# Patient Record
Sex: FEMALE | Race: BLACK OR AFRICAN AMERICAN | Employment: UNEMPLOYED | URBAN - METROPOLITAN AREA
[De-identification: names, ages, dates, MRNs, and addresses within clinical notes are randomized per-mention and may not be internally consistent; named-entity substitution may affect disease eponyms.]

---

## 2019-03-12 ENCOUNTER — OFFICE VISIT (OUTPATIENT)
Dept: FAMILY MEDICINE | Facility: CLINIC | Age: 59
End: 2019-03-12
Payer: COMMERCIAL

## 2019-03-12 VITALS
OXYGEN SATURATION: 99 % | BODY MASS INDEX: 46.26 KG/M2 | TEMPERATURE: 98.1 F | HEIGHT: 64 IN | WEIGHT: 271 LBS | HEART RATE: 104 BPM | RESPIRATION RATE: 20 BRPM | DIASTOLIC BLOOD PRESSURE: 100 MMHG | SYSTOLIC BLOOD PRESSURE: 140 MMHG

## 2019-03-12 DIAGNOSIS — Z13.220 LIPID SCREENING: ICD-10-CM

## 2019-03-12 DIAGNOSIS — E66.01 MORBID OBESITY (H): ICD-10-CM

## 2019-03-12 DIAGNOSIS — R73.01 IMPAIRED FASTING GLUCOSE: ICD-10-CM

## 2019-03-12 DIAGNOSIS — R74.01 ELEVATED AST (SGOT): ICD-10-CM

## 2019-03-12 DIAGNOSIS — I10 BENIGN ESSENTIAL HYPERTENSION: ICD-10-CM

## 2019-03-12 DIAGNOSIS — Z00.00 ROUTINE GENERAL MEDICAL EXAMINATION AT A HEALTH CARE FACILITY: Primary | ICD-10-CM

## 2019-03-12 PROCEDURE — 99202 OFFICE O/P NEW SF 15 MIN: CPT | Performed by: FAMILY MEDICINE

## 2019-03-12 RX ORDER — ASPIRIN 81 MG/1
81 TABLET ORAL
COMMUNITY
Start: 2015-04-09 | End: 2020-02-25

## 2019-03-12 RX ORDER — TRIAMCINOLONE ACETONIDE 1 MG/G
OINTMENT TOPICAL
COMMUNITY
Start: 2017-03-30 | End: 2020-02-25

## 2019-03-12 SDOH — HEALTH STABILITY: MENTAL HEALTH: HOW OFTEN DO YOU HAVE A DRINK CONTAINING ALCOHOL?: NEVER

## 2019-03-12 ASSESSMENT — MIFFLIN-ST. JEOR: SCORE: 1804.25

## 2019-03-12 NOTE — PATIENT INSTRUCTIONS
Preventive Health Recommendations  Female Ages 50 - 64    Yearly exam: See your health care provider every year in order to  o Review health changes.   o Discuss preventive care.    o Review your medicines if your doctor has prescribed any.      Get a Pap test every three years (unless you have an abnormal result and your provider advises testing more often).    If you get Pap tests with HPV test, you only need to test every 5 years, unless you have an abnormal result.     You do not need a Pap test if your uterus was removed (hysterectomy) and you have not had cancer.    You should be tested each year for STDs (sexually transmitted diseases) if you're at risk.     Have a mammogram every 1 to 2 years.    Have a colonoscopy at age 50, or have a yearly FIT test (stool test). These exams screen for colon cancer.      Have a cholesterol test every 5 years, or more often if advised.    Have a diabetes test (fasting glucose) every three years. If you are at risk for diabetes, you should have this test more often.     If you are at risk for osteoporosis (brittle bone disease), think about having a bone density scan (DEXA).    Shots: Get a flu shot each year. Get a tetanus shot every 10 years.    Nutrition:     Eat at least 5 servings of fruits and vegetables each day.    Eat whole-grain bread, whole-wheat pasta and brown rice instead of white grains and rice.    Get adequate Calcium and Vitamin D.     Lifestyle    Exercise at least 150 minutes a week (30 minutes a day, 5 days a week). This will help you control your weight and prevent disease.    Limit alcohol to one drink per day.    No smoking.     Wear sunscreen to prevent skin cancer.     See your dentist every six months for an exam and cleaning.    See your eye doctor every 1 to 2 years.      1. Your blood pressure has been high or you are starting a new medication for it :   Please take 2-4-6 blood pressures and heart rates ocer a few days  and write them down  with date, time of day and location and bring this record in in 3-5 weeks. The optimal blood  pressure is < 140/80 or close to this most of the time.    BP was 160/100    If still high, return     2.  Weight Loss Tips  1. Do not eat after 6 hrs before your expected bedtime  2. Have your heaviest meal for breakfast, a slightly lighter meal at lunch and a snack 6 hrs before bed  3. No sugar/calorie drinks except milk ie no fruit juice, pop, alcohol.  4. Drink milk 30min before meals to decrease your hunger. Also it is excellent as part of your last meal of the day snack  5. Drink lots of water  6. Increase fiber in diet: all bran cereal, salads, popcorn etc  7. Have only one small serving of fruit a day about 1/2 cup (as this is high in sugar)  8. EXERCISE is the bottom line. Without it, you will gain weight even on a low calorie diet. Best if done 2-3X a day as can    Being overweight contributes to high blood pressure and high cholesterol, both of which cause heart attacks, strokes and kidney failure, prediabetes and diabetes, arthritis, and liver disease       3. . Schedule your mammo at [facility]  At [address] at [phone]      4. Please do your breast exam every mo, when you  Change the  calendar page or set an alarm on your cell phone Do a  visual check for dimples, inversion or indentation or any different position of the nipple Feel manually  for any 1cm or larger  size mass ie about the size of an almond Be sure to cover the entire area of both breasts : this extends back to the back on either side and from the collar bone to the bottom of the breasts where you can begin to feel ribs.  Men are advised to do this exam also as they now have a higher rate of breast cancer , like women do .

## 2019-03-12 NOTE — NURSING NOTE
"Chief Complaint   Patient presents with     Physical     BP (!) 160/100   Pulse 104   Temp 98.1  F (36.7  C) (Tympanic)   Resp 20   Ht 1.626 m (5' 4\")   Wt 122.9 kg (271 lb)   LMP  (LMP Unknown)   SpO2 99%   Breastfeeding? No   BMI 46.52 kg/m   Estimated body mass index is 46.52 kg/m  as calculated from the following:    Height as of this encounter: 1.626 m (5' 4\").    Weight as of this encounter: 122.9 kg (271 lb).  BP completed using cuff size: georgi Silva CMA    Health Maintenance Due   Topic Date Due     PHQ-2 Q1 YR  12/19/1974     HIV SCREEN (SYSTEM ASSIGNED)  12/19/1980     HEPATITIS C SCREENING  12/19/1980     PAP SCREENING Q3 YR (SYSTEM ASSIGNED)  12/19/1983     LIPID SCREEN Q5 YR FEMALE (SYSTEM ASSIGNED)  12/19/2007     MAMMO SCREEN Q2 YR (SYSTEM ASSIGNED)  02/19/2009     COLON CANCER SCREEN (SYSTEM ASSIGNED)  12/19/2012     ZOSTER IMMUNIZATION (1 of 2) 12/19/2012     ADVANCE DIRECTIVE PLANNING Q5 YRS  12/19/2017     INFLUENZA VACCINE (1) 09/01/2018     Health Maintenance reviewed at today's visit patient asked to schedule/complete:   Routine Health Visit: Patient agrees to schedule  Breast Cancer:  Patient agrees to schedule  Cervical Cancer:  Patient agrees to schedule  Immunizations:  Patient agrees to schedule    "

## 2019-05-10 ENCOUNTER — OFFICE VISIT (OUTPATIENT)
Dept: FAMILY MEDICINE | Facility: CLINIC | Age: 59
End: 2019-05-10
Payer: COMMERCIAL

## 2019-05-10 VITALS
TEMPERATURE: 97.2 F | OXYGEN SATURATION: 99 % | BODY MASS INDEX: 46.86 KG/M2 | HEART RATE: 64 BPM | RESPIRATION RATE: 16 BRPM | SYSTOLIC BLOOD PRESSURE: 110 MMHG | WEIGHT: 273 LBS | DIASTOLIC BLOOD PRESSURE: 70 MMHG

## 2019-05-10 DIAGNOSIS — Z12.11 SCREEN FOR COLON CANCER: ICD-10-CM

## 2019-05-10 DIAGNOSIS — Z83.3 FAMILY HISTORY OF DIABETES MELLITUS: ICD-10-CM

## 2019-05-10 DIAGNOSIS — Z11.59 NEED FOR HEPATITIS C SCREENING TEST: ICD-10-CM

## 2019-05-10 DIAGNOSIS — Z86.2 HISTORY OF IRON DEFICIENCY ANEMIA: ICD-10-CM

## 2019-05-10 DIAGNOSIS — R03.0 ELEVATED BLOOD PRESSURE READING WITHOUT DIAGNOSIS OF HYPERTENSION: Primary | ICD-10-CM

## 2019-05-10 DIAGNOSIS — Z13.6 CARDIOVASCULAR SCREENING; LDL GOAL LESS THAN 160: ICD-10-CM

## 2019-05-10 DIAGNOSIS — R74.01 ELEVATED AST (SGOT): ICD-10-CM

## 2019-05-10 DIAGNOSIS — Z12.4 SCREENING FOR MALIGNANT NEOPLASM OF CERVIX: ICD-10-CM

## 2019-05-10 DIAGNOSIS — Z11.4 SCREENING FOR HIV (HUMAN IMMUNODEFICIENCY VIRUS): ICD-10-CM

## 2019-05-10 LAB
ALBUMIN SERPL-MCNC: 3.9 G/DL (ref 3.4–5)
ALP SERPL-CCNC: 72 U/L (ref 40–150)
ALT SERPL W P-5'-P-CCNC: 21 U/L (ref 0–50)
ANION GAP SERPL CALCULATED.3IONS-SCNC: 5 MMOL/L (ref 3–14)
AST SERPL W P-5'-P-CCNC: 34 U/L (ref 0–45)
BASOPHILS # BLD AUTO: 0 10E9/L (ref 0–0.2)
BASOPHILS NFR BLD AUTO: 0.3 %
BILIRUB SERPL-MCNC: 0.4 MG/DL (ref 0.2–1.3)
BUN SERPL-MCNC: 9 MG/DL (ref 7–30)
CALCIUM SERPL-MCNC: 9.6 MG/DL (ref 8.5–10.1)
CHLORIDE SERPL-SCNC: 107 MMOL/L (ref 94–109)
CHOLEST SERPL-MCNC: 190 MG/DL
CO2 SERPL-SCNC: 26 MMOL/L (ref 20–32)
CREAT SERPL-MCNC: 0.74 MG/DL (ref 0.52–1.04)
DIFFERENTIAL METHOD BLD: ABNORMAL
EOSINOPHIL # BLD AUTO: 0.2 10E9/L (ref 0–0.7)
EOSINOPHIL NFR BLD AUTO: 2.5 %
ERYTHROCYTE [DISTWIDTH] IN BLOOD BY AUTOMATED COUNT: 15.2 % (ref 10–15)
GFR SERPL CREATININE-BSD FRML MDRD: >90 ML/MIN/{1.73_M2}
GLUCOSE SERPL-MCNC: 90 MG/DL (ref 70–99)
HBA1C MFR BLD: 5.4 % (ref 0–5.6)
HCT VFR BLD AUTO: 39.7 % (ref 35–47)
HDLC SERPL-MCNC: 82 MG/DL
HGB BLD-MCNC: 12.6 G/DL (ref 11.7–15.7)
LDLC SERPL CALC-MCNC: 95 MG/DL
LYMPHOCYTES # BLD AUTO: 3.4 10E9/L (ref 0.8–5.3)
LYMPHOCYTES NFR BLD AUTO: 49.9 %
MCH RBC QN AUTO: 27.2 PG (ref 26.5–33)
MCHC RBC AUTO-ENTMCNC: 31.7 G/DL (ref 31.5–36.5)
MCV RBC AUTO: 86 FL (ref 78–100)
MONOCYTES # BLD AUTO: 0.6 10E9/L (ref 0–1.3)
MONOCYTES NFR BLD AUTO: 9 %
NEUTROPHILS # BLD AUTO: 2.6 10E9/L (ref 1.6–8.3)
NEUTROPHILS NFR BLD AUTO: 38.3 %
NONHDLC SERPL-MCNC: 108 MG/DL
PLATELET # BLD AUTO: 217 10E9/L (ref 150–450)
POTASSIUM SERPL-SCNC: 3.7 MMOL/L (ref 3.4–5.3)
PROT SERPL-MCNC: 8.9 G/DL (ref 6.8–8.8)
RBC # BLD AUTO: 4.63 10E12/L (ref 3.8–5.2)
SODIUM SERPL-SCNC: 138 MMOL/L (ref 133–144)
TRIGL SERPL-MCNC: 66 MG/DL
WBC # BLD AUTO: 6.8 10E9/L (ref 4–11)

## 2019-05-10 PROCEDURE — 87624 HPV HI-RISK TYP POOLED RSLT: CPT | Performed by: FAMILY MEDICINE

## 2019-05-10 PROCEDURE — 80061 LIPID PANEL: CPT | Performed by: FAMILY MEDICINE

## 2019-05-10 PROCEDURE — 86803 HEPATITIS C AB TEST: CPT | Performed by: FAMILY MEDICINE

## 2019-05-10 PROCEDURE — 99214 OFFICE O/P EST MOD 30 MIN: CPT | Performed by: FAMILY MEDICINE

## 2019-05-10 PROCEDURE — 36415 COLL VENOUS BLD VENIPUNCTURE: CPT | Performed by: FAMILY MEDICINE

## 2019-05-10 PROCEDURE — 87389 HIV-1 AG W/HIV-1&-2 AB AG IA: CPT | Performed by: FAMILY MEDICINE

## 2019-05-10 PROCEDURE — 80053 COMPREHEN METABOLIC PANEL: CPT | Performed by: FAMILY MEDICINE

## 2019-05-10 PROCEDURE — 85025 COMPLETE CBC W/AUTO DIFF WBC: CPT | Performed by: FAMILY MEDICINE

## 2019-05-10 PROCEDURE — G0145 SCR C/V CYTO,THINLAYER,RESCR: HCPCS | Performed by: FAMILY MEDICINE

## 2019-05-10 PROCEDURE — 83036 HEMOGLOBIN GLYCOSYLATED A1C: CPT | Performed by: FAMILY MEDICINE

## 2019-05-10 NOTE — PATIENT INSTRUCTIONS
I strongly recommend getting the shingles vaccine (Shingrix) if you are over age 50, but please check with your insurance to see how much you might have to pay for this vaccine! If you are on Medicare, it's covered if you get it at a pharmacy but not in a clinic.    This shot will prevent shingles, a painful skin rash that you are at risk for getting if you have ever had chicken pox. Sometimes the pain will last the rest of your life, even after the rash has healed, and there is not much that we can do to relieve the pain.    Please consider getting this vaccine!

## 2019-05-10 NOTE — PROGRESS NOTES
SUBJECTIVE:   Maida Jarrett is a 58 year old female who presents to clinic today for the following   health issues:      Hypertension Follow-up      Outpatient blood pressures are not being checked.    Low Salt Diet: no added salt  BP Readings from Last 3 Encounters:   05/10/19 110/70   03/12/19 (!) 140/100            Amount of exercise or physical activity: 4-5 days/week for an average of 30-45 minutes    Problems taking medications regularly: No    Medication side effects: none    Diet: low salt, does not eat meat      Family HIstory of Diabetes       Patient is checking blood sugars: not at all    Diabetic concerns: None     Symptoms of hypoglycemia (low blood sugar): none     Paresthesias (numbness or burning in feet) or sores: No       BP Readings from Last 2 Encounters:   05/10/19 110/70   03/12/19 (!) 140/100     No results found for: A1C, LDL    Diabetes Management Resources    History of iron deficiency anemia  Diagnosed many years ago, was related to heavy periods with fatigue and lack of energy. She is now menopausal. She has taken iron supplements off and on over the past few years. She denies chest pain, sob, fatigue.    Patient Active Problem List   Diagnosis     Morbid obesity with BMI of 40.0-44.9, adult (H)     Elevated blood pressure reading without diagnosis of hypertension     Morbid obesity (H)  BMI 40-50     Elevated AST (SGOT)     Past Surgical History:   Procedure Laterality Date     TUBAL LIGATION  1987       Social History     Tobacco Use     Smoking status: Never Smoker     Smokeless tobacco: Never Used   Substance Use Topics     Alcohol use: No     Frequency: Never     Family History   Problem Relation Age of Onset     Cancer Mother 80        liver/bile duct cancer     Hypertension Mother      Diabetes Type 2  Mother      Liver Cancer Father 40         No Known Allergies  No lab results found.   BP Readings from Last 3 Encounters:   05/10/19 110/70   03/12/19 (!) 140/100    Wt  Readings from Last 3 Encounters:   05/10/19 123.8 kg (273 lb)   03/12/19 122.9 kg (271 lb)                    ROS:  Constitutional, HEENT, cardiovascular, pulmonary, GI, , musculoskeletal, neuro, skin, endocrine and psych systems are negative, except as otherwise noted.    OBJECTIVE:     /70 (BP Location: Left arm, Patient Position: Sitting, Cuff Size: Adult Large)   Pulse 64   Temp 97.2  F (36.2  C) (Tympanic)   Resp 16   Wt 123.8 kg (273 lb)   LMP  (LMP Unknown)   SpO2 99%   Breastfeeding? No   BMI 46.86 kg/m    Body mass index is 46.86 kg/m .  GENERAL: healthy, alert and no distress  EYES: Eyes grossly normal to inspection, PERRL and conjunctivae and sclerae normal  HENT: ear canals and TM's normal, nose and mouth without ulcers or lesions  NECK: no adenopathy, no asymmetry, masses, or scars and thyroid normal to palpation  RESP: lungs clear to auscultation - no rales, rhonchi or wheezes  CV: regular rate and rhythm, normal S1 S2, no S3 or S4, no murmur, click or rub, no peripheral edema and peripheral pulses strong  ABDOMEN: soft, nontender, no hepatosplenomegaly, no masses and bowel sounds normal   (female): normal female external genitalia, normal urethral meatus, vaginal mucosa pink, moist, well rugated, and normal cervix/adnexa/uterus without masses or discharge  MS: no gross musculoskeletal defects noted, no edema  SKIN: no suspicious lesions or rashes  NEURO: Normal strength and tone, mentation intact and speech normal  PSYCH: mentation appears normal, affect normal/bright    Results for orders placed or performed in visit on 05/10/19   Hemoglobin A1c   Result Value Ref Range    Hemoglobin A1C 5.4 0 - 5.6 %   CBC with platelets and differential   Result Value Ref Range    WBC 6.8 4.0 - 11.0 10e9/L    RBC Count 4.63 3.8 - 5.2 10e12/L    Hemoglobin 12.6 11.7 - 15.7 g/dL    Hematocrit 39.7 35.0 - 47.0 %    MCV 86 78 - 100 fl    MCH 27.2 26.5 - 33.0 pg    MCHC 31.7 31.5 - 36.5 g/dL    RDW  15.2 (H) 10.0 - 15.0 %    Platelet Count 217 150 - 450 10e9/L    % Neutrophils 38.3 %    % Lymphocytes 49.9 %    % Monocytes 9.0 %    % Eosinophils 2.5 %    % Basophils 0.3 %    Absolute Neutrophil 2.6 1.6 - 8.3 10e9/L    Absolute Lymphocytes 3.4 0.8 - 5.3 10e9/L    Absolute Monocytes 0.6 0.0 - 1.3 10e9/L    Absolute Eosinophils 0.2 0.0 - 0.7 10e9/L    Absolute Basophils 0.0 0.0 - 0.2 10e9/L    Diff Method Automated Method         ASSESSMENT/PLAN:     1. Elevated blood pressure reading without diagnosis of hypertension  Now normal, but will check kidney function today and monitor blood pressure regularly  - Comprehensive metabolic panel    2. History of iron deficiency anemia  Hemoglobin   Date Value Ref Range Status   05/10/2019 12.6 11.7 - 15.7 g/dL Final   07/30/2009 12.2 11.7 - 15.7 g/dL Final   03/25/2008 10.2 (L) 11.7 - 15.7 g/dL Final   normal today  - CBC with platelets and differential    3. Family history of diabetes mellitus  Normal today  - Hemoglobin A1c    4. Elevated AST (SGOT)  recheck  - Comprehensive metabolic panel    5. Screening for malignant neoplasm of cervix  Pap done today  Will fu as indicated.   - Pap imaged thin layer screen with HPV - recommended age 30 - 65 years (select HPV order below)  - HPV High Risk Types DNA Cervical    6. Screen for colon cancer  - GASTROENTEROLOGY ADULT REF PROCEDURE ONLY    7. Need for hepatitis C screening test  - Hepatitis C Screen Reflex to HCV RNA Quant and Genotype    8. Screening for HIV (human immunodeficiency virus)  - HIV Screening    9. CARDIOVASCULAR SCREENING; LDL GOAL LESS THAN 160  No results found for: LDL   - Lipid panel reflex to direct LDL Fasting  - Comprehensive metabolic panel        Lindsey Quintanilla MD  [facility]

## 2019-05-10 NOTE — LETTER
May 16, 2019    Maida Jarrett  [address]  [place] [address]    Dear ,  This letter is regarding your recent Pap smear (cervical cancer screening) and Human Papillomavirus (HPV) test.  We are happy to inform you that your Pap smear result is normal. Cervical cancer is closely linked with certain types of HPV. Your results showed no evidence of high-risk HPV.  Therefore we recommend you return in 5 years for your next pap smear and HPV test.  You will still need to return to the clinic every year for an annual exam and other preventive tests.  If you have additional questions regarding this result, please call our registered nurse, Shara at [phone].  Sincerely,    Lindsey Quintanilla MD/[facility]

## 2019-05-13 LAB
HCV AB SERPL QL IA: NONREACTIVE
HIV 1+2 AB+HIV1 P24 AG SERPL QL IA: NONREACTIVE

## 2019-05-14 LAB
COPATH REPORT: NORMAL
PAP: NORMAL

## 2019-05-15 LAB
FINAL DIAGNOSIS: NORMAL
HPV HR 12 DNA CVX QL NAA+PROBE: NEGATIVE
HPV16 DNA SPEC QL NAA+PROBE: NEGATIVE
HPV18 DNA SPEC QL NAA+PROBE: NEGATIVE
SPECIMEN DESCRIPTION: NORMAL
SPECIMEN SOURCE CVX/VAG CYTO: NORMAL

## 2019-05-25 NOTE — RESULT ENCOUNTER NOTE
Hello!  It was a pleasure to see you in clinic!  Thank you for getting labs done.  Everything looks normal, which is good news.     Your cbc, or complete blood count, which measures red blood cells (to check for anemia and other vitamin deficiencies) and white blood cells (to check for infection and leukemia) is normal, which is great!  Your platelets, which reflect liver function and ability to clot, are normal as well.     The testing of your blood sugar, kidney function, liver function and electrolytes was normal.     Your hiv test was negative for infection, you do NOT have this disease.     Your screening test was negative for Hepatitis C, which is great news! You have NOT been infected with this liver disease.  You do not need any further testing for Hepatitis C.     Your cholesterol is at goal and looks great!.    Your lab test to check for diabetes, HgA1C, also called glycosylated hemoglobin, which measures the level of sugar in your blood over the past few months, is normal which is great!     Congratulations, you don't have diabetes!  If you have any questions, please contact the clinic or schedule an appointment with me, thank you!    Sincerely,    JUJU ALFORD MD   5/24/2019

## 2020-01-31 ENCOUNTER — OFFICE VISIT (OUTPATIENT)
Dept: FAMILY MEDICINE | Facility: CLINIC | Age: 60
End: 2020-01-31
Payer: COMMERCIAL

## 2020-01-31 ENCOUNTER — TELEPHONE (OUTPATIENT)
Dept: FAMILY MEDICINE | Facility: CLINIC | Age: 60
End: 2020-01-31

## 2020-01-31 VITALS
TEMPERATURE: 97.6 F | HEIGHT: 65 IN | HEART RATE: 59 BPM | RESPIRATION RATE: 16 BRPM | DIASTOLIC BLOOD PRESSURE: 68 MMHG | WEIGHT: 278.5 LBS | BODY MASS INDEX: 46.4 KG/M2 | SYSTOLIC BLOOD PRESSURE: 132 MMHG | OXYGEN SATURATION: 97 %

## 2020-01-31 DIAGNOSIS — E55.9 VITAMIN D DEFICIENCY: ICD-10-CM

## 2020-01-31 DIAGNOSIS — N95.0 POST-MENOPAUSAL BLEEDING: Primary | ICD-10-CM

## 2020-01-31 DIAGNOSIS — Z12.11 SPECIAL SCREENING FOR MALIGNANT NEOPLASMS, COLON: ICD-10-CM

## 2020-01-31 DIAGNOSIS — E66.01 MORBID OBESITY (H): ICD-10-CM

## 2020-01-31 DIAGNOSIS — Z12.31 ENCOUNTER FOR SCREENING MAMMOGRAM FOR BREAST CANCER: ICD-10-CM

## 2020-01-31 DIAGNOSIS — Z13.6 CARDIOVASCULAR SCREENING; LDL GOAL LESS THAN 130: ICD-10-CM

## 2020-01-31 DIAGNOSIS — Z78.9 VEGAN DIET: ICD-10-CM

## 2020-01-31 LAB
ALBUMIN SERPL-MCNC: 3.6 G/DL (ref 3.4–5)
ALP SERPL-CCNC: 58 U/L (ref 40–150)
ALT SERPL W P-5'-P-CCNC: 20 U/L (ref 0–50)
ANION GAP SERPL CALCULATED.3IONS-SCNC: 8 MMOL/L (ref 3–14)
AST SERPL W P-5'-P-CCNC: 31 U/L (ref 0–45)
BASOPHILS # BLD AUTO: 0 10E9/L (ref 0–0.2)
BASOPHILS NFR BLD AUTO: 0.2 %
BILIRUB SERPL-MCNC: 0.4 MG/DL (ref 0.2–1.3)
BUN SERPL-MCNC: 9 MG/DL (ref 7–30)
CALCIUM SERPL-MCNC: 9.2 MG/DL (ref 8.5–10.1)
CHLORIDE SERPL-SCNC: 108 MMOL/L (ref 94–109)
CHOLEST SERPL-MCNC: 176 MG/DL
CO2 SERPL-SCNC: 24 MMOL/L (ref 20–32)
CREAT SERPL-MCNC: 0.7 MG/DL (ref 0.52–1.04)
DEPRECATED CALCIDIOL+CALCIFEROL SERPL-MC: 15 UG/L (ref 20–75)
DIFFERENTIAL METHOD BLD: ABNORMAL
EOSINOPHIL # BLD AUTO: 0.1 10E9/L (ref 0–0.7)
EOSINOPHIL NFR BLD AUTO: 2.2 %
ERYTHROCYTE [DISTWIDTH] IN BLOOD BY AUTOMATED COUNT: 15.3 % (ref 10–15)
FOLATE SERPL-MCNC: 22.4 NG/ML
FSH SERPL-ACNC: 44.7 IU/L
GFR SERPL CREATININE-BSD FRML MDRD: >90 ML/MIN/{1.73_M2}
GLUCOSE SERPL-MCNC: 99 MG/DL (ref 70–99)
HCT VFR BLD AUTO: 41.6 % (ref 35–47)
HDLC SERPL-MCNC: 73 MG/DL
HGB BLD-MCNC: 12.9 G/DL (ref 11.7–15.7)
LDLC SERPL CALC-MCNC: 91 MG/DL
LH SERPL-ACNC: 20.9 IU/L
LYMPHOCYTES # BLD AUTO: 2.1 10E9/L (ref 0.8–5.3)
LYMPHOCYTES NFR BLD AUTO: 46 %
MCH RBC QN AUTO: 26.5 PG (ref 26.5–33)
MCHC RBC AUTO-ENTMCNC: 31 G/DL (ref 31.5–36.5)
MCV RBC AUTO: 85 FL (ref 78–100)
MONOCYTES # BLD AUTO: 0.5 10E9/L (ref 0–1.3)
MONOCYTES NFR BLD AUTO: 10.2 %
NEUTROPHILS # BLD AUTO: 1.9 10E9/L (ref 1.6–8.3)
NEUTROPHILS NFR BLD AUTO: 41.4 %
NONHDLC SERPL-MCNC: 103 MG/DL
PLATELET # BLD AUTO: 186 10E9/L (ref 150–450)
POTASSIUM SERPL-SCNC: 3.8 MMOL/L (ref 3.4–5.3)
PROT SERPL-MCNC: 8.5 G/DL (ref 6.8–8.8)
RBC # BLD AUTO: 4.87 10E12/L (ref 3.8–5.2)
SODIUM SERPL-SCNC: 140 MMOL/L (ref 133–144)
TRIGL SERPL-MCNC: 61 MG/DL
TSH SERPL DL<=0.005 MIU/L-ACNC: 2.98 MU/L (ref 0.4–4)
VIT B12 SERPL-MCNC: 451 PG/ML (ref 193–986)
WBC # BLD AUTO: 4.5 10E9/L (ref 4–11)

## 2020-01-31 PROCEDURE — 80050 GENERAL HEALTH PANEL: CPT | Performed by: FAMILY MEDICINE

## 2020-01-31 PROCEDURE — 80061 LIPID PANEL: CPT | Performed by: FAMILY MEDICINE

## 2020-01-31 PROCEDURE — 83002 ASSAY OF GONADOTROPIN (LH): CPT | Performed by: FAMILY MEDICINE

## 2020-01-31 PROCEDURE — 82306 VITAMIN D 25 HYDROXY: CPT | Performed by: FAMILY MEDICINE

## 2020-01-31 PROCEDURE — 83001 ASSAY OF GONADOTROPIN (FSH): CPT | Performed by: FAMILY MEDICINE

## 2020-01-31 PROCEDURE — 82607 VITAMIN B-12: CPT | Performed by: FAMILY MEDICINE

## 2020-01-31 PROCEDURE — 36415 COLL VENOUS BLD VENIPUNCTURE: CPT | Performed by: FAMILY MEDICINE

## 2020-01-31 PROCEDURE — 99214 OFFICE O/P EST MOD 30 MIN: CPT | Performed by: FAMILY MEDICINE

## 2020-01-31 PROCEDURE — 82746 ASSAY OF FOLIC ACID SERUM: CPT | Performed by: FAMILY MEDICINE

## 2020-01-31 RX ORDER — PRENATAL VIT/IRON FUM/FOLIC AC 27MG-0.8MG
1 TABLET ORAL DAILY
COMMUNITY
End: 2021-08-03

## 2020-01-31 ASSESSMENT — MIFFLIN-ST. JEOR: SCORE: 1841.21

## 2020-01-31 NOTE — PATIENT INSTRUCTIONS
Health Maintenance Due   Topic Date Due     PREVENTIVE CARE VISIT  1960     ADVANCE CARE PLANNING  1960     COLONOSCOPY  12/19/1972     MAMMO SCREENING  02/19/2009     ZOSTER IMMUNIZATION (1 of 2) 12/19/2012     INFLUENZA VACCINE (1) 09/01/2019     PHQ-2  01/01/2020      Please schedule a pelvic ultrasound.  Please call [facility]iology Department to schedule an outpatient appointment at [phone].        Please  your FIT test from the lab!    Mammogram due  You can call any of the centers below to schedule your mammogram.  1.  [facility]  Appointment line [phone]  2   [facility]   Appointment line [phone]   3.   [facility]   Appointment line [phone]

## 2020-01-31 NOTE — RESULT ENCOUNTER NOTE
Patient was seen today in clinic.  I discussed results in clinic, please see clinic progress note.    Lindsey Quintanilla MD 1/31/2020

## 2020-01-31 NOTE — PROGRESS NOTES
Subjective     Maida Jarrett is a 59 year old female who presents to clinic today for the following health issues:    HPI   Vaginal Bleeding (Dysmenorrhea)      Onset: 1 week ago; had a few days of heavy bleeding similar to a period, no significant clots, then a few days of blood noted when wiping, not associated with cramping, pain or bloating. Currently not having any bleeding.    She hasn't had a period in well over a year.    Description:  Describe bleeding/flow:   Clots: no   Number of pads/hour: 1 to 2    Accompanying signs and symptoms: heavy the first day then light after couple of days    History (similar episodes/previous evaluation): None, no history of fibroids, no history of taking tamoxifen    Precipitating or alleviating factors: Pt in menopause    Therapies tried and outcome: None      Patient Active Problem List   Diagnosis     Morbid obesity with BMI of 40.0-44.9, adult (H)     Elevated blood pressure reading without diagnosis of hypertension     Morbid obesity (H)  BMI 40-50     Elevated AST (SGOT)     Post-menopausal bleeding     Obesity, Class III, BMI 40-49.9 (morbid obesity) (H)     Past Surgical History:   Procedure Laterality Date     TUBAL LIGATION  1987       Social History     Tobacco Use     Smoking status: Never Smoker     Smokeless tobacco: Never Used   Substance Use Topics     Alcohol use: No     Frequency: Never     Family History   Problem Relation Age of Onset     Cancer Mother 80        liver/bile duct cancer     Hypertension Mother      Diabetes Type 2  Mother      Liver Cancer Father 40         No Known Allergies  Recent Labs   Lab Test 05/10/19  1145   A1C 5.4   LDL 95   HDL 82   TRIG 66   ALT 21   CR 0.74   GFRESTIMATED >90   GFRESTBLACK >90   POTASSIUM 3.7      BP Readings from Last 3 Encounters:   01/31/20 132/68   05/10/19 110/70   03/12/19 (!) 140/100    Wt Readings from Last 3 Encounters:   01/31/20 126.3 kg (278 lb 8 oz)   05/10/19 123.8 kg (273 lb)   03/12/19 122.9 kg  "(271 lb)           Reviewed and updated as needed this visit by Provider  Problems  Med Hx         Review of Systems   ROS COMP: Constitutional, HEENT, cardiovascular, pulmonary, GI, , musculoskeletal, neuro, skin, endocrine and psych systems are negative, except as otherwise noted.      Objective    /68 (BP Location: Right arm, Patient Position: Sitting, Cuff Size: Adult Large)   Pulse 59   Temp 97.6  F (36.4  C) (Tympanic)   Resp 16   Ht 1.638 m (5' 4.5\")   Wt 126.3 kg (278 lb 8 oz)   LMP  (LMP Unknown)   SpO2 97%   BMI 47.07 kg/m    Body mass index is 47.07 kg/m .   Wt Readings from Last 4 Encounters:   01/31/20 126.3 kg (278 lb 8 oz)   05/10/19 123.8 kg (273 lb)   03/12/19 122.9 kg (271 lb)     Physical Exam   GENERAL: healthy, alert and no distress  NECK: no adenopathy, no asymmetry, masses, or scars and thyroid normal to palpation  RESP: lungs clear to auscultation - no rales, rhonchi or wheezes  CV: regular rate and rhythm, normal S1 S2, no S3 or S4, no murmur, click or rub, no peripheral edema and peripheral pulses strong  ABDOMEN: soft, nontender, no hepatosplenomegaly, no masses and bowel sounds normal  MS: no gross musculoskeletal defects noted, no edema  SKIN: no suspicious lesions or rashes  NEURO: Normal strength and tone, mentation intact and speech normal  PSYCH: mentation appears normal, affect normal/bright  LYMPH: normal ant/post cervical, supraclavicular nodes        Results for orders placed or performed in visit on 01/31/20   CBC with platelets and differential     Status: Abnormal   Result Value Ref Range    WBC 4.5 4.0 - 11.0 10e9/L    RBC Count 4.87 3.8 - 5.2 10e12/L    Hemoglobin 12.9 11.7 - 15.7 g/dL    Hematocrit 41.6 35.0 - 47.0 %    MCV 85 78 - 100 fl    MCH 26.5 26.5 - 33.0 pg    MCHC 31.0 (L) 31.5 - 36.5 g/dL    RDW 15.3 (H) 10.0 - 15.0 %    Platelet Count 186 150 - 450 10e9/L    % Neutrophils 41.4 %    % Lymphocytes 46.0 %    % Monocytes 10.2 %    % Eosinophils 2.2 " "%    % Basophils 0.2 %    Absolute Neutrophil 1.9 1.6 - 8.3 10e9/L    Absolute Lymphocytes 2.1 0.8 - 5.3 10e9/L    Absolute Monocytes 0.5 0.0 - 1.3 10e9/L    Absolute Eosinophils 0.1 0.0 - 0.7 10e9/L    Absolute Basophils 0.0 0.0 - 0.2 10e9/L    Diff Method Automated Method         Assessment & Plan     1. Post-menopausal bleeding  New, previously undiagnosed problem with uncertain prognosis and additional work-up planned.   Differential includes endometrial cancer, cervical polyp  Quantity of bleeding not really consistent with symptoms due vaginal atrophy, and she has had recent normal pelvic exam (May 2019) with no mention of polyps  Risk factor; morbid obesity  no history of taking tamoxifen, not diabetic    Obtain pelvic US and refer to OB/Gyn for biopsy if endometrial lining > 4 mm or other concerning symptoms   Lab work up as below  - OB/GYN REFERRAL  - CBC with platelets and differential  - Follicle stimulating hormone  - Lutropin  - TSH with free T4 reflex  - Comprehensive metabolic panel  - OB/GYN REFERRAL    2. Special screening for malignant neoplasms, colon  - GASTROENTEROLOGY ADULT REF PROCEDURE ONLY    3. Encounter for screening mammogram for breast cancer  - MA SCREENING DIGITAL BILAT - Future  (s+30); Future    4. Vegan diet  - Vitamin B12  - Folate    5. Vitamin D deficiency  - Vitamin D Deficiency    6. CARDIOVASCULAR SCREENING; LDL GOAL LESS THAN 130  - Lipid panel reflex to direct LDL Fasting     BMI:   Estimated body mass index is 47.07 kg/m  as calculated from the following:    Height as of this encounter: 1.638 m (5' 4.5\").    Weight as of this encounter: 126.3 kg (278 lb 8 oz).         Return in about 1 week (around 2/7/2020) for pelvic US.    Lindsey Quintanilla MD  [facility]        "

## 2020-02-03 ENCOUNTER — HOSPITAL ENCOUNTER (OUTPATIENT)
Dept: ULTRASOUND IMAGING | Facility: CLINIC | Age: 60
Discharge: HOME | End: 2020-02-03
Attending: FAMILY MEDICINE | Admitting: FAMILY MEDICINE
Payer: COMMERCIAL

## 2020-02-03 DIAGNOSIS — N95.0 POST-MENOPAUSAL BLEEDING: ICD-10-CM

## 2020-02-03 PROCEDURE — 76830 TRANSVAGINAL US NON-OB: CPT

## 2020-02-03 NOTE — LETTER
February 5, 2020      Maida Jarrett  [address]  [place] [address]        Dear ,    We are writing to inform you of your test results.    Thank you for getting your ultrasound done!  You do have a large number of fibroids that may be causing your symptoms. Fibroids are benign (NOT cancerous) growths of the muscle of the uterus. The radiologist couldn't actually measure your endometrial lining because the fibroids wer in the way. I do recommend follow up with the ob/gyn as we discussed as the specialist can talk with you about the next best steps for evaluation and treatment.     If you have any questions, please contact the clinic or schedule an appointment with me, thank you!     Resulted Orders   US Pelvic Complete w Transvaginal    Narrative    US PELVIC COMPLETE WITH TRANSVAGINAL 2/3/2020 3:04 PM     HISTORY: Post-menopausal bleeding  COMPARISON: CT 10/23/2006  TECHNIQUE: Transabdominal scans were performed. Endovaginal ultrasound  was performed to better visualize the adnexa.    FINDINGS:    UTERUS: 9 x 7 x 6 cm. Multiple uterine fibroids, many of which are  partially calcified. The largest fibroid measures 4 cm in diameter.    ENDOMETRIUM: The endometrium is obscured by the multiple fibroids. mm.    Neither ovary is seen due to bowel gas.    No significant free fluid.      Impression    IMPRESSION:  1.  Multiple uterine fibroids, similar to prior CT.  2.  Endometrium is obscured by the fibroids, and cannot be evaluated.        ADALID RIZVI MD       If you have any questions or concerns, please call the clinic at the number listed above.       Sincerely,    Lindsey Quintanilla MD/nr

## 2020-02-04 DIAGNOSIS — E55.9 VITAMIN D DEFICIENCY: Primary | ICD-10-CM

## 2020-02-04 RX ORDER — ERGOCALCIFEROL 1.25 MG/1
50000 CAPSULE, LIQUID FILLED ORAL WEEKLY
Qty: 12 CAPSULE | Refills: 0 | Status: SHIPPED | OUTPATIENT
Start: 2020-02-04 | End: 2021-08-03

## 2020-02-04 NOTE — RESULT ENCOUNTER NOTE
Thank you for getting your ultrasound done!  You do have a large number of fibroids that may be causing your symptoms. Fibroids are benign (NOT cancerous) growths of the muscle of the uterus. The radiologist couldn't actually measure your endometrial lining because the fibroids wer in the way. I do recommend follow up with the ob/gyn as we discussed as the specialist can talk with you about the next best steps for evaluation and treatment.    If you have any questions, please contact the clinic or schedule an appointment with me, thank you!    Sincerely,  Dr. Lindsey Quintanilla MD  2/4/2020

## 2020-02-04 NOTE — RESULT ENCOUNTER NOTE
Hello!  It was a pleasure to see you in clinic!  Thank you for getting labs done.     Your vitamin D level is low.    As you may know, vitamin D deficiency is associated with many medical problems including osteoporosis, muscles aches and pains, weakness and falls.  Maintaining adequate levels is very important for good health.  During winter months (October through March), people in northern climates are not able to synthesize vitamin D from sunlight and therefore have higher rates of deficiency due to inadquate oral intake. Vitamin D deficiency is very common in [place]!    I recommend taking a high dose supplement for 3 months, 50,000 units once per week.  You could consider rechecking your level when you have finished this course. I will send a prescription to your pharmacy.     After you are done with the high dose,  I recommend taking a daily supplement of 2000 units daily.      Everything else looks totally normal!  Your folate, B12 and thyroid levels are normal.  The testing of your blood sugar, kidney function, liver function and electrolytes was normal. Your cbc, or complete blood count, which measures red blood cells (to check for anemia and other vitamin deficiencies) and white blood cells (to check for infection and leukemia) is normal, which is great!  Your platelets, which reflect liver function and ability to clot, are normal as well.     Your cholesterol is fantastic!  Your total cholesterol is low, and your HDL, or good cholesterol, is very high, which is great, as this protects your heart from heart disease and shows that you get a good amount of exercise.      Your triglycerides are also very low.  Triglycerides are increased by eating lots of sugar, including juice, excess fruit, bread, pasta, rice and cereal, so you must not eat a lot of these things (or you have good genes!)      Your hormone levels show that you are most likely post menopausal as both the lutropin and FSH are high. They are  at normal values for someone who is post menopausal.    If you have any questions, please contact the clinic or schedule an appointment with me, thank you!    Sincerely,    Lindsey Quintanilla MD

## 2020-02-05 ENCOUNTER — TELEPHONE (OUTPATIENT)
Dept: FAMILY MEDICINE | Facility: CLINIC | Age: 60
End: 2020-02-05

## 2020-02-05 NOTE — TELEPHONE ENCOUNTER
Reason for Call:  Request for results:    Name of test or procedure: Pelvic ultrasound    Date of test of procedure: 2/3/2020    Location of the test or procedure: [facility]     OK to leave the result message on voice mail or with a family member? YES    Phone number Patient can be reached at:  Home number on file [phone] (home)    Additional comments: Please call after 1200    Call taken on 2/5/2020 at 8:44 AM by Nichole Ling

## 2020-02-05 NOTE — TELEPHONE ENCOUNTER
Called patient, relayed provider imaging result message (see 02/03/2020 result note), patient verbalized understanding and is in agreement with plan    Thank You!  America Funez, RN  Triage Nurse

## 2020-02-25 ENCOUNTER — OFFICE VISIT (OUTPATIENT)
Dept: OBGYN | Facility: CLINIC | Age: 60
End: 2020-02-25
Attending: FAMILY MEDICINE
Payer: COMMERCIAL

## 2020-02-25 VITALS
SYSTOLIC BLOOD PRESSURE: 159 MMHG | DIASTOLIC BLOOD PRESSURE: 83 MMHG | HEIGHT: 65 IN | WEIGHT: 284.8 LBS | BODY MASS INDEX: 47.45 KG/M2 | HEART RATE: 86 BPM

## 2020-02-25 DIAGNOSIS — N95.0 POST-MENOPAUSAL BLEEDING: Primary | ICD-10-CM

## 2020-02-25 PROCEDURE — 88305 TISSUE EXAM BY PATHOLOGIST: CPT | Performed by: OBSTETRICS & GYNECOLOGY

## 2020-02-25 PROCEDURE — 58100 BIOPSY OF UTERUS LINING: CPT | Mod: ZF | Performed by: INTERNAL MEDICINE

## 2020-02-25 PROCEDURE — G0463 HOSPITAL OUTPT CLINIC VISIT: HCPCS | Mod: ZF

## 2020-02-25 ASSESSMENT — ANXIETY QUESTIONNAIRES
7. FEELING AFRAID AS IF SOMETHING AWFUL MIGHT HAPPEN: NOT AT ALL
GAD7 TOTAL SCORE: 0
5. BEING SO RESTLESS THAT IT IS HARD TO SIT STILL: NOT AT ALL
3. WORRYING TOO MUCH ABOUT DIFFERENT THINGS: NOT AT ALL
6. BECOMING EASILY ANNOYED OR IRRITABLE: NOT AT ALL
2. NOT BEING ABLE TO STOP OR CONTROL WORRYING: NOT AT ALL
IF YOU CHECKED OFF ANY PROBLEMS ON THIS QUESTIONNAIRE, HOW DIFFICULT HAVE THESE PROBLEMS MADE IT FOR YOU TO DO YOUR WORK, TAKE CARE OF THINGS AT HOME, OR GET ALONG WITH OTHER PEOPLE: NOT DIFFICULT AT ALL
1. FEELING NERVOUS, ANXIOUS, OR ON EDGE: NOT AT ALL

## 2020-02-25 ASSESSMENT — MIFFLIN-ST. JEOR: SCORE: 1869.78

## 2020-02-25 ASSESSMENT — PATIENT HEALTH QUESTIONNAIRE - PHQ9
5. POOR APPETITE OR OVEREATING: NOT AT ALL
SUM OF ALL RESPONSES TO PHQ QUESTIONS 1-9: 0

## 2020-02-25 NOTE — PATIENT INSTRUCTIONS
We will call you with the results of the endometrial biopsy, and you can follow up in clinic after the results are back.

## 2020-02-25 NOTE — PROGRESS NOTES
"[facility]  Gynecology Visit    Reason for Consult: Postmenopausal bleeding  Consulting Provider: Lindsey Quintanilla MD    HPI:    Maida Jarrett is a 59 year old , here for evaluation of postmenopausal bleeding. She reports onset of bleeding in the middle of 2020. She had several days of heavy bleeding similar to a period, and at its heaviest, she was changing a pad every 1-2 hours. She had not had any bleeding in well over a year, although she reports one day of \"random\" bleeding in the last year. She has not had any bleeding since January.    GYN History  - Menses: LMP in early 50s.   - Pap Smears: No history of abnormal pap smears.     Lab Results   Component Value Date    PAP NIL 05/10/2019     - Contraception: postmenopausal  - Sexual Activity/Concerns: sexually active with one male parter, no issues  - Hx STIs/UTIs: none    OBHx  OB History    Para Term  AB Living   2 2 2 0 0 2   SAB TAB Ectopic Multiple Live Births   0 0 0 0 2      # Outcome Date GA Lbr Pravin/2nd Weight Sex Delivery Anes PTL Lv   2 Term 10/28/87 40w0d   M   N YULISA      Name: Chance   1 Term 82 40w0d   F   N YULISA      Name: Luis       PMHx: Mobid obesity (BMI 48.13), elevated blood pressure reading without diagnosis of hypertension  PSHx: Tubal ligation ()  Meds: Vitamin D  Allergies:  NKDA    SocHx: Occasional alcohol use. No tobacco or drug use. 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    FamHx:  No family history of uterine, cervical, ovarian or breast cancer. No history of bleeding or clotting disorders    ROS: 10-Point ROS negative except as noted in HPI    Physical Exam  BP (!) 159/83 (BP Location: Left arm, Patient Position: Chair)   Pulse 86   Ht 1.638 m (5' 4.5\")   Wt 129.2 kg (284 lb 12.8 oz)   LMP  (LMP Unknown)   Breastfeeding No   BMI 48.13 kg/m    Body mass index is 48.13 kg/m .    Gen: alert, oriented, no acute distress  Resp: breathing comfortably on " "room air  CV: regular rate  Pelvic:  Normal appearing external female genitalia. Multiparous cervix without lesion. No vaginal discharge.     Endometrial Biopsy:  Time Out - \"Pause for the Cause\"  Just before the procedure begins, confirmed:                    Initials   Patient Name EM   Patient  EM   Procedure to be performed EM                                                                                                                                      Indication: Postmenopausal bleeding  Consent: Verbal consent obtained from patient. , Risks, benefits of treatment, and no treatment were discussed.  Patient's questions were elicited and answered. , Written consent signed and scanned into medical record. and Patient received and verbalized understanding of discharge instructions    Using a medium Graves speculum, the cervix was visualized. The cervix was prepped with Betadine x3. An attempt was made to pass the pipelle through the cervix. A single tooth tenaculum was applied to the anterior lip of the cervix. The os finder was used. The endometrial pipelle was advanced through the cervix. The tenaculum was removed from the cervix and the tenaculum site made hemostatic with silver nitrate and pressure.     EBL: 2 mL    Complications: none apparent  Pathology: EMB sample was sent to pathology.  Tolerance of Procedure:  Patient tolerated the procedure well.     Imaging:  UTERUS: 9 x 7 x 6 cm. Multiple uterine fibroids, many of which are  partially calcified. The largest fibroid measures 4 cm in diameter.     ENDOMETRIUM: The endometrium is obscured by the multiple fibroids. mm.  Neither ovary is seen due to bowel gas.  No significant free fluid.                                                             IMPRESSION:  1.  Multiple uterine fibroids, similar to prior CT.  2.  Endometrium is obscured by the fibroids, and cannot be evaluated.    Assessment/Plan:  Maida Jarrett is a 59 year old  female " here for postmenopausal bleeding.     #Postmenopausal bleeding  - Unable to evaluate endometrial stripe on transvaginal ultrasound given multiple uterine fibroids.   - Briefly discussed the possible etiologies of postmenopausal bleeding including atrophy, polyps, hyperplasia, and endometrial cancer. The amount of bleeding the patient had was not consistent with simple atrophy. The transvaginal ultrasound was unable to evaluate the endometrial stripe given the large leiomyomas. The patient is at risk for hyperplasia given morbid obesity.   - Endometrial biopsy obtained today. Patient prefers to be called with the results. If the call goes to voicemail, please give the patient instructions for giving the clinic a call back (she does not want the specific diagnosis to be left on the voicemail).   - Laboratory evaluation already performed includes: Hgb 12.9, FSH 44.7 (consistent with postmenopausal state), TSH wnl    #Health maintenance  - Last pap smear NILM, HPV negative (5/10/2019)  - Encouraged annual health maintenance visits    Return to clinic after results of the endometrial biopsy.    Judith Cali MD  OB/GYN, PGY4  02/25/20    I saw and evaluated the patient. I agree with the   findings and the plan of care as documented in the resident's note.  I was present for endometrial biopsy. MD Fernando

## 2020-02-25 NOTE — LETTER
"2020       RE: Maida Jarrett  [address]  [facility] [place] [address]     Dear Colleague,    Thank you for referring your patient, Maida Jarrett, to the [facility] at [facility]. Please see a copy of my visit note below.    [facility]  Gynecology Visit    Reason for Consult: Postmenopausal bleeding  Consulting Provider: Lindsey Quintanilla MD    HPI:    Maida Jarrett is a 59 year old , here for evaluation of postmenopausal bleeding. She reports onset of bleeding in the middle of 2020. She had several days of heavy bleeding similar to a period, and at its heaviest, she was changing a pad every 1-2 hours. She had not had any bleeding in well over a year, although she reports one day of \"random\" bleeding in the last year. She has not had any bleeding since January.    GYN History  - Menses: LMP in early 50s.   - Pap Smears: No history of abnormal pap smears.     Lab Results   Component Value Date    PAP NIL 05/10/2019     - Contraception: postmenopausal  - Sexual Activity/Concerns: sexually active with one male parter, no issues  - Hx STIs/UTIs: none    OBHx  OB History    Para Term  AB Living   2 2 2 0 0 2   SAB TAB Ectopic Multiple Live Births   0 0 0 0 2      # Outcome Date GA Lbr Pravin/2nd Weight Sex Delivery Anes PTL Lv   2 Term 10/28/87 40w0d   M   N YULISA      Name: Chance   1 Term 84 40w0d   F   N YULISA      Name: Luis       PMHx: Mobid obesity (BMI 48.13), elevated blood pressure reading without diagnosis of hypertension  PSHx: Tubal ligation ()  Meds: Vitamin D  Allergies:  NKDA    SocHx: Occasional alcohol use. No tobacco or drug use. 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    FamHx:  No family history of uterine, cervical, ovarian or breast cancer. No history of bleeding or clotting disorders    ROS: 10-Point ROS negative except as noted in HPI    Physical Exam  BP (!) " "159/83 (BP Location: Left arm, Patient Position: Chair)   Pulse 86   Ht 1.638 m (5' 4.5\")   Wt 129.2 kg (284 lb 12.8 oz)   LMP  (LMP Unknown)   Breastfeeding No   BMI 48.13 kg/m     Body mass index is 48.13 kg/m .    Gen: alert, oriented, no acute distress  Resp: breathing comfortably on room air  CV: regular rate  Pelvic:  Normal appearing external female genitalia. Multiparous cervix without lesion. No vaginal discharge.     Endometrial Biopsy:  Time Out - \"Pause for the Cause\"  Just before the procedure begins, confirmed:                    Initials   Patient Name EM   Patient  EM   Procedure to be performed EM                                                                                                                                      Indication: Postmenopausal bleeding  Consent: Verbal consent obtained from patient. , Risks, benefits of treatment, and no treatment were discussed.  Patient's questions were elicited and answered. , Written consent signed and scanned into medical record. and Patient received and verbalized understanding of discharge instructions    Using a medium Graves speculum, the cervix was visualized. The cervix was prepped with Betadine x3. An attempt was made to pass the pipelle through the cervix. A single tooth tenaculum was applied to the anterior lip of the cervix. The os finder was used. The endometrial pipelle was advanced through the cervix. The tenaculum was removed from the cervix and the tenaculum site made hemostatic with silver nitrate and pressure.     EBL: 2 mL    Complications: none apparent  Pathology: EMB sample was sent to pathology.  Tolerance of Procedure:  Patient tolerated the procedure well.     Imaging:  UTERUS: 9 x 7 x 6 cm. Multiple uterine fibroids, many of which are  partially calcified. The largest fibroid measures 4 cm in diameter.     ENDOMETRIUM: The endometrium is obscured by the multiple fibroids. mm.  Neither ovary is seen due to bowel " gas.  No significant free fluid.                                                             IMPRESSION:  1.  Multiple uterine fibroids, similar to prior CT.  2.  Endometrium is obscured by the fibroids, and cannot be evaluated.    Assessment/Plan:  Maida Jarrett is a 59 year old  female here for postmenopausal bleeding.     #Postmenopausal bleeding  - Unable to evaluate endometrial stripe on transvaginal ultrasound given multiple uterine fibroids.   - Briefly discussed the possible etiologies of postmenopausal bleeding including atrophy, polyps, hyperplasia, and endometrial cancer. The amount of bleeding the patient had was not consistent with simple atrophy. The transvaginal ultrasound was unable to evaluate the endometrial stripe given the large leiomyomas. The patient is at risk for hyperplasia given morbid obesity.   - Endometrial biopsy obtained today. Patient prefers to be called with the results. If the call goes to voicemail, please give the patient instructions for giving the clinic a call back (she does not want the specific diagnosis to be left on the voicemail).   - Laboratory evaluation already performed includes: Hgb 12.9, FSH 44.7 (consistent with postmenopausal state), TSH wnl    #Health maintenance  - Last pap smear NILM, HPV negative (5/10/2019)  - Encouraged annual health maintenance visits    Return to clinic after results of the endometrial biopsy.    Judith Cali MD  OB/GYN, PGY4  20    I saw and evaluated the patient. I agree with the   findings and the plan of care as documented in the resident's note.  I was present for endometrial biopsy. MD Fernando          Again, thank you for allowing me to participate in the care of your patient.      Sincerely,    Judith Cali MD

## 2020-02-25 NOTE — NURSING NOTE
Chief Complaint   Patient presents with     Consult     Post menopausal bleeding       See GREGORIO Israel 2/25/2020

## 2020-02-26 ASSESSMENT — ANXIETY QUESTIONNAIRES: GAD7 TOTAL SCORE: 0

## 2020-02-27 LAB — COPATH REPORT: NORMAL

## 2020-03-13 ENCOUNTER — TELEPHONE (OUTPATIENT)
Dept: OBGYN | Facility: CLINIC | Age: 60
End: 2020-03-13

## 2020-03-13 NOTE — TELEPHONE ENCOUNTER
Called patient to discuss endometrial biopsy results.  Left voicemail stating my name and the phone number of [facility] clinic without details.    Plans to discuss with patient that her biopsy did not show any abnormalities, however some of the tissue was necrotic/dead and she should schedule a follow-up visit if she has ongoing abnormal bleeding.    Vira Olguin MD  PGY-4 OB/GYN

## 2020-04-14 ENCOUNTER — VIRTUAL VISIT (OUTPATIENT)
Dept: FAMILY MEDICINE | Facility: CLINIC | Age: 60
End: 2020-04-14
Payer: COMMERCIAL

## 2020-04-14 DIAGNOSIS — R03.0 ELEVATED BLOOD PRESSURE READING WITHOUT DIAGNOSIS OF HYPERTENSION: Primary | ICD-10-CM

## 2020-04-14 DIAGNOSIS — Z02.89 ENCOUNTER FOR COMPLETION OF FORM WITH PATIENT: ICD-10-CM

## 2020-04-14 DIAGNOSIS — J06.9 RECURRENT URI (UPPER RESPIRATORY INFECTION): ICD-10-CM

## 2020-04-14 PROCEDURE — 99214 OFFICE O/P EST MOD 30 MIN: CPT | Mod: GT | Performed by: FAMILY MEDICINE

## 2020-04-14 NOTE — PROGRESS NOTES
"Maida Jarrett is a 59 year old female who is being evaluated via a billable video visit.      The patient has been notified of following:     \"This video visit will be conducted via a call between you and your physician/provider. We have found that certain health care needs can be provided without the need for an in-person physical exam.  This service lets us provide the care you need with a video conversation.  If a prescription is necessary we can send it directly to your pharmacy.  If lab work is needed we can place an order for that and you can then stop by our lab to have the test done at a later time.    Video visits are billed at different rates depending on your insurance coverage.  Please reach out to your insurance provider with any questions.    If during the course of the call the physician/provider feels a video visit is not appropriate, you will not be charged for this service.\"    Patient has given verbal consent for Video visit? Yes    How would you like to obtain your AVS? josseline     Patient would like the video invitation sent by: Send to e-mail at: [email or web address]      Subjective     Maida Jarrett is a 59 year old female who presents to clinic today for the following health issues:  FMLA Forms   She's been back to work, needs intermittent time off starting   She has recurrent URI with sob, difficulty breathing 2/27/2020. Usually 5 days off illness when she's sick, and reports getting sick about once per month  She usually uses over the counter medications. She hasn't seen an asthma or allergist specialist. She's never smoked.  She takes vitamin C daily.  She had second smoke exposure as child (father)        How much fatigue or shortness of breath do you have when you are walking?  None    How much shortness of breath do you have when you are resting?  None    How often do you cough? Never    Have you noticed any change in your sputum/phlegm?  No    Have you experienced a recent " fever? No    Please describe how far you can walk without stopping to rest:  Less than a mile    How many flights of stairs are you able to walk up without stopping?  2        BP Readings from Last 3 Encounters:   02/25/20 (!) 159/83   01/31/20 132/68   05/10/19 110/70        History   Smoking Status     Never Smoker   Smokeless Tobacco     Never Used     No results found for: FEV1, LTC7VXT       Relationships   Social connections     Talks on phone: Not on file     Gets together: Not on file     Attends [employer] service: Not on file     Active member of club or organization: Not on file     Attends meetings of clubs or organizations: Not on file     Relationship status: Not on file      Social History     Tobacco Use     Smoking status: Never Smoker     Smokeless tobacco: Never Used   Substance Use Topics     Alcohol use: No     Frequency: Never     Drug use: No       Video Start Time: 12:00    Patient Active Problem List   Diagnosis     Elevated blood pressure reading without diagnosis of hypertension     Morbid obesity (H)  BMI 40-50     Elevated AST (SGOT)     Post-menopausal bleeding     Obesity, Class III, BMI 40-49.9 (morbid obesity) (H)     Body mass index (BMI) of 45.0-49.9 in adult (H)     Vitamin D deficiency     Recurrent URI (upper respiratory infection)     Past Surgical History:   Procedure Laterality Date     TUBAL LIGATION  1987       Social History     Tobacco Use     Smoking status: Never Smoker     Smokeless tobacco: Never Used   Substance Use Topics     Alcohol use: No     Frequency: Never     Family History   Problem Relation Age of Onset     Cancer Mother 80        liver/bile duct cancer     Hypertension Mother      Diabetes Type 2  Mother      Liver Cancer Father 40         No Known Allergies  Recent Labs   Lab Test 01/31/20  0833 05/10/19  1145   A1C  --  5.4   LDL 91 95   HDL 73 82   TRIG 61 66   ALT 20 21   CR 0.70 0.74   GFRESTIMATED >90 >90   GFRESTBLACK >90 >90   POTASSIUM 3.8 3.7  "  TSH 2.98  --       BP Readings from Last 3 Encounters:   02/25/20 (!) 159/83   01/31/20 132/68   05/10/19 110/70    Wt Readings from Last 3 Encounters:   02/25/20 129.2 kg (284 lb 12.8 oz)   01/31/20 126.3 kg (278 lb 8 oz)   05/10/19 123.8 kg (273 lb)                    Reviewed and updated as needed this visit by Provider  Problems         Review of Systems   ROS COMP: Constitutional, HEENT, cardiovascular, pulmonary, GI, , musculoskeletal, neuro, skin, endocrine and psych systems are negative, except as otherwise noted.      Objective    LMP  (LMP Unknown)   Estimated body mass index is 48.13 kg/m  as calculated from the following:    Height as of 2/25/20: 1.638 m (5' 4.5\").    Weight as of 2/25/20: 129.2 kg (284 lb 12.8 oz).  Physical Exam   GENERAL: healthy, alert and no distress  RESP: no respiratory distress  PSYCH: mentation appears normal, affect normal/bright         Assessment & Plan     1. Elevated blood pressure reading without diagnosis of hypertension  Noted at February visit with prior normal readings, possibly related to stress in anticipation of endometrial biopsy, continue to monitor    2. Recurrent URI (upper respiratory infection)  See HPi, she has had recurrent absences at work due to recurrent, prolonged URI symptoms, without evidence of COPD or other underlying lung disease, see HPI.  See completed FMLA, will fax to her HR department.    3. Encounter for completion of form with patient  More than 50% of this 30 minute visit was spent counseling patient and coordinating care as noted above.       Return in about 3 months (around 7/14/2020) for Wellness Exam.    Lindsey Quintanilla MD  [facility]      Video-Visit Details    Type of service:  Video Visit    Video End Time (time video stopped): 12:30 PM     Originating Location (pt. Location): Home    Distant Location (provider location):  [facility]     Mode of Communication:  Video Conference via " Nico    Return in about 3 months (around 7/14/2020) for Wellness Exam.       Lindsey Quintanilla MD

## 2020-04-15 ENCOUNTER — DOCUMENTATION ONLY (OUTPATIENT)
Dept: FAMILY MEDICINE | Facility: CLINIC | Age: 60
End: 2020-04-15

## 2020-04-15 NOTE — PROGRESS NOTES
called pt requesting her to bring in another copy of [facility] paperwork. (we can not locate the original)  Will drop off on Friday, 4- before 4pm (when I leave)/amr

## 2020-04-17 ENCOUNTER — DOCUMENTATION ONLY (OUTPATIENT)
Dept: FAMILY MEDICINE | Facility: CLINIC | Age: 60
End: 2020-04-17

## 2020-04-23 NOTE — PROGRESS NOTES
I completed FMLA paperwork, will leave at .  Return to work/workability form NOT signed by patient, I'll leave at  ([place] for her to sign.  Please let patient know, thanks!    Sincerely,  Dr. Lindsey Quintanilla MD  4/23/2020

## 2020-04-27 NOTE — PROGRESS NOTES
Patient was called on 4-.  Made arrangements to meet in entryway at [facility] on 4- at 2:30pm to sign forms.  We are to make copies and then hand over FMLA form to patient to take with her.    Patient never showed up on 4-.    Form is at .

## 2020-04-30 NOTE — PROGRESS NOTES
Patient stopped into the clinic today, signed her form and I faxed form to:    [employer]  Fax: [phone]

## 2020-05-01 NOTE — PROGRESS NOTES
Called pt and lvm that her forms are ready for . Placed form at  for p/u.        Perla Vivar CMA

## 2020-05-06 ENCOUNTER — TELEPHONE (OUTPATIENT)
Dept: FAMILY MEDICINE | Facility: CLINIC | Age: 60
End: 2020-05-06

## 2020-05-18 ENCOUNTER — TELEPHONE (OUTPATIENT)
Dept: FAMILY MEDICINE | Facility: CLINIC | Age: 60
End: 2020-05-18

## 2020-05-18 DIAGNOSIS — M25.562 LEFT KNEE PAIN, UNSPECIFIED CHRONICITY: Primary | ICD-10-CM

## 2020-05-18 NOTE — TELEPHONE ENCOUNTER
Reason for call:  Patient reporting a symptom    Symptom or request: Knee pain    Duration (how long have symptoms been present):   1 week      Have you been treated for this before? No    Additional comments: Patient has been treating with diclofenac sodium topical ointment with little resolve.  The patient would like an injection.  How would PCP like the appointment scheduled?    Phone Number patient can be reached at:  Home number on file [phone] (home)    Best Time:      Can we leave a detailed message on this number:  YES    Call taken on 5/18/2020 at 12:46 PM by Nichole Ling

## 2020-05-18 NOTE — TELEPHONE ENCOUNTER
1 week of knee pain using diclofenac sodium topical ointment with out relief. She would like it injected. Is this something you can do or do you want to see ortho?

## 2020-05-19 NOTE — TELEPHONE ENCOUNTER
Writer called patient, reviewed message per Dr. Quintanilla, confirmed left knee is affected side, and reviewed scheduling number for Orthopedics.      Patient verbalized understanding and in agreement with plan.    Orthopedic referral ordered per Dr. Quintanilla.  TASIA NolanN, RN

## 2020-05-19 NOTE — TELEPHONE ENCOUNTER
HI,  I haven't seen her for this and there aren't any knee xrays in our system or care everywhere.  I recommend she see orthopedics, I think they are seeing some in office visits, although they might want to do a virtual visit first for assessment.   I'll put referral in. Please let her know, thanks! The ortho  should call her over the next few days to discuss the appointment.   I don't even know what knee is affected, I put order in for left knee.  Sincerely,  Dr. Lindsey Quintanilla MD  5/19/2020

## 2020-06-09 ENCOUNTER — OFFICE VISIT (OUTPATIENT)
Dept: ORTHOPEDICS | Facility: CLINIC | Age: 60
End: 2020-06-09
Attending: FAMILY MEDICINE
Payer: COMMERCIAL

## 2020-06-09 ENCOUNTER — ANCILLARY PROCEDURE (OUTPATIENT)
Dept: GENERAL RADIOLOGY | Facility: CLINIC | Age: 60
End: 2020-06-09
Attending: FAMILY MEDICINE
Payer: COMMERCIAL

## 2020-06-09 VITALS
WEIGHT: 285 LBS | DIASTOLIC BLOOD PRESSURE: 70 MMHG | HEIGHT: 65 IN | SYSTOLIC BLOOD PRESSURE: 136 MMHG | BODY MASS INDEX: 47.48 KG/M2

## 2020-06-09 DIAGNOSIS — M25.562 LEFT KNEE PAIN, UNSPECIFIED CHRONICITY: ICD-10-CM

## 2020-06-09 DIAGNOSIS — M17.12 PRIMARY OSTEOARTHRITIS OF LEFT KNEE: Primary | ICD-10-CM

## 2020-06-09 PROCEDURE — 20610 DRAIN/INJ JOINT/BURSA W/O US: CPT | Mod: LT | Performed by: FAMILY MEDICINE

## 2020-06-09 PROCEDURE — 73562 X-RAY EXAM OF KNEE 3: CPT | Performed by: FAMILY MEDICINE

## 2020-06-09 PROCEDURE — 99204 OFFICE O/P NEW MOD 45 MIN: CPT | Mod: 25 | Performed by: FAMILY MEDICINE

## 2020-06-09 RX ORDER — ROPIVACAINE HYDROCHLORIDE 5 MG/ML
3 INJECTION, SOLUTION EPIDURAL; INFILTRATION; PERINEURAL
Status: DISCONTINUED | OUTPATIENT
Start: 2020-06-09 | End: 2021-08-03

## 2020-06-09 RX ORDER — TRIAMCINOLONE ACETONIDE 40 MG/ML
40 INJECTION, SUSPENSION INTRA-ARTICULAR; INTRAMUSCULAR
Status: DISCONTINUED | OUTPATIENT
Start: 2020-06-09 | End: 2021-08-03

## 2020-06-09 RX ADMIN — ROPIVACAINE HYDROCHLORIDE 3 ML: 5 INJECTION, SOLUTION EPIDURAL; INFILTRATION; PERINEURAL at 12:01

## 2020-06-09 RX ADMIN — TRIAMCINOLONE ACETONIDE 40 MG: 40 INJECTION, SUSPENSION INTRA-ARTICULAR; INTRAMUSCULAR at 12:01

## 2020-06-09 ASSESSMENT — MIFFLIN-ST. JEOR: SCORE: 1870.69

## 2020-06-09 NOTE — PROGRESS NOTES
[facility]   Clinic Visit s Jun 9, 2020    PCP: Lindsey Quintanilla    ASSESSMENT/PLAN    ICD-10-CM    1. Primary osteoarthritis of left knee  M17.12    2. Left knee pain, unspecified chronicity  M25.562 Orthopedic & Spine  Referral     Large Joint Injection/Arthocentesis: L knee joint     Acute mild flare of osteoarthritis of the left knee. Reviewed nature of degenerative arthritis, discussed options including joint protection strategies and symptom management, including NSAIDS,  acetaminophen on a scheduled and/or as needed basis, joint injection with cortisone or hyaluronic acid derivatives, bracing, glucosamine, maintenance of overall knee stability through strengthening through physical therapy.  Pt opts for  symptom treatment, activity modification/rest and injection therapy      Today's Visit:  Maida is a 59 year old female who is seen in consultation at the request of Dr. Quintanilla for   Chief Complaint   Patient presents with     Left Knee - Pain       Injury: Reports insidious onset without acute precipitating event. She reports left knee pain for 1 month. She reports pain in the anterior knee. Her pain increases with weight bearing for a prolonged period of time.     Location of Pain: left knee anterior , nonradiating   Duration of Pain: acute, 1 month(s),   Rating of Pain at worst: 6/10  Rating of Pain Currently: 0/10  Pain is better with: rest   Pain is worse with: stair climbing, standing and walking   Treatment so far consists of: Aspercreme,   Associated symptoms: no distal numbness or tingling; denies swelling or warmth  Recent imaging completed: X-rays completed 6/9/20.  Prior History of related problems: nothing    Social History: is employed as a/an CNA      Past Medical History:   Diagnosis Date     NO ACTIVE PROBLEMS        Patient Active Problem List    Diagnosis Date Noted     Recurrent URI (upper respiratory infection) 04/14/2020     Priority: Medium      Vitamin D deficiency 02/04/2020     Priority: Medium     Post-menopausal bleeding 01/31/2020     Priority: Medium     Body mass index (BMI) of 45.0-49.9 in adult (H) 01/31/2020     Priority: Medium     Morbid obesity (H)  BMI 40-50 03/12/2019     Priority: Medium     Elevated AST (SGOT) 03/12/2019     Priority: Medium     Elevated blood pressure reading without diagnosis of hypertension 09/21/2016     Priority: Medium     Obesity, Class III, BMI 40-49.9 (morbid obesity) (H) 11/25/2014     Priority: Medium       Family History   Problem Relation Age of Onset     Cancer Mother 80        liver/bile duct cancer     Hypertension Mother      Diabetes Type 2  Mother      Liver Cancer Father 40       Social History     Socioeconomic History     Marital status:      Spouse name: Not on file     Number of children: Not on file     Years of education: Not on file     Highest education level: Not on file   Occupational History     Not on file   Social Needs     Financial resource strain: Not on file     Food insecurity     Worry: Not on file     Inability: Not on file     Transportation needs     Medical: Not on file     Non-medical: Not on file   Tobacco Use     Smoking status: Never Smoker     Smokeless tobacco: Never Used   Substance and Sexual Activity     Alcohol use: No     Frequency: Never     Drug use: No     Sexual activity: Yes     Partners: Male     Birth control/protection: Female Surgical   Lifestyle     Physical activity     Days per week: Not on file     Minutes per session: Not on file     Stress: Not on file   Relationships     Social connections     Talks on phone: Not on file     Gets together: Not on file     Attends [employer] service: Not on file     Active member of club or organization: Not on file     Attends meetings of clubs or organizations: Not on file     Relationship status: Not on file     Intimate partner violence     Fear of current or ex partner: Not on file     Emotionally abused:  "Not on file     Physically abused: Not on file     Forced sexual activity: Not on file   Other Topics Concern     Not on file   Social History Narrative     Not on file       Past Surgical History:   Procedure Laterality Date     TUBAL LIGATION  1987               Review of Systems   Musculoskeletal: Positive for joint pain.   All other systems reviewed and are negative.        Physical Exam  Musculoskeletal:      Left knee: She exhibits effusion.        /70   Ht 1.638 m (5' 4.5\")   Wt 129.3 kg (285 lb)   LMP  (LMP Unknown)   BMI 48.16 kg/m    Constitutional:well-developed, well-nourished, and in no distress.   Cardiovascular: Intact distal pulses.    Neurological: alert. Gait Normal:   Gait, station, stance, and balance appear normal for age  Skin: Skin is warm and dry.   Psychiatric: Mood and affect normal.   Respiratory: unlabored, speaks in full sentences  Lymph: no LAD, no lymphangitis        Left Knee Exam     Tenderness   The patient is experiencing no tenderness.     Range of Motion   Extension: normal   Flexion: abnormal Left knee flexion: Pain at end range of full flexion.     Tests   Asuncion:  Medial - negative Lateral - negative  Varus: negative Valgus: negative  Drawer:  Anterior - negative     Posterior - negative  Patellar apprehension: negative    Other   Erythema: absent  Scars: absent  Sensation: normal  Pulse: present  Swelling: mild  Effusion: effusion present    Comments:  Crepitus with range of motion                 X-ray images Ordered and independently reviewed by me in the office today with the patient. X-ray shows:   Recent Results (from the past 48 hour(s))   XR Knee Standing AP Bilat [place] Bilat Lat Left    Narrative    6/9/2020    Mild degenerative changes noted by the mild tibial spine spurring on the   left knee, with osteophytic spurring also of the lateral patella   bilaterally and the superior patella on the left.  Otherwise no acute   fractures or soft tissue " deformities.             Large Joint Injection/Arthocentesis: L knee joint    Date/Time: 6/9/2020 12:01 PM  Performed by: Rustam Medellin MD  Authorized by: Rustam Medellin MD     Indications:  Pain  Needle Size:  25 G  Guidance: landmark guided    Approach:  Anterolateral  Location:  Knee      Medications:  40 mg triamcinolone 40 MG/ML; 3 mL ropivacaine 5 MG/ML  Medications comment:  4 mL were used of the Ropivacaine  Outcome:  Tolerated well, no immediate complications  Procedure discussed: discussed risks, benefits, and alternatives    Consent Given by:  Patient  Timeout: timeout called immediately prior to procedure    Prep: patient was prepped and draped in usual sterile fashion

## 2020-06-09 NOTE — LETTER
6/9/2020         RE: Maida Jarrett  [address]  [facility] [address]        Dear Colleague,    Thank you for referring your patient, Maida Jarrett, to the  SPORTS MEDICINE. Please see a copy of my visit note below.    [facility]   Clinic Visit s Jun 9, 2020    PCP: Lindsey Quintanilla    ASSESSMENT/PLAN    ICD-10-CM    1. Primary osteoarthritis of left knee  M17.12    2. Left knee pain, unspecified chronicity  M25.562 Orthopedic & Spine  Referral     Large Joint Injection/Arthocentesis: L knee joint     Acute mild flare of osteoarthritis of the left knee. Reviewed nature of degenerative arthritis, discussed options including joint protection strategies and symptom management, including NSAIDS,  acetaminophen on a scheduled and/or as needed basis, joint injection with cortisone or hyaluronic acid derivatives, bracing, glucosamine, maintenance of overall knee stability through strengthening through physical therapy.  Pt opts for  symptom treatment, activity modification/rest and injection therapy      Today's Visit:  Maida is a 59 year old female who is seen in consultation at the request of Dr. Quintanilla for   Chief Complaint   Patient presents with     Left Knee - Pain       Injury: Reports insidious onset without acute precipitating event. She reports left knee pain for 1 month. She reports pain in the anterior knee. Her pain increases with weight bearing for a prolonged period of time.     Location of Pain: left knee anterior , nonradiating   Duration of Pain: acute, 1 month(s),   Rating of Pain at worst: 6/10  Rating of Pain Currently: 0/10  Pain is better with: rest   Pain is worse with: stair climbing, standing and walking   Treatment so far consists of: Aspercreme,   Associated symptoms: no distal numbness or tingling; denies swelling or warmth  Recent imaging completed: X-rays completed 6/9/20.  Prior History of related problems: nothing    Social  History: is employed as a/an CNA      Past Medical History:   Diagnosis Date     NO ACTIVE PROBLEMS        Patient Active Problem List    Diagnosis Date Noted     Recurrent URI (upper respiratory infection) 04/14/2020     Priority: Medium     Vitamin D deficiency 02/04/2020     Priority: Medium     Post-menopausal bleeding 01/31/2020     Priority: Medium     Body mass index (BMI) of 45.0-49.9 in adult (H) 01/31/2020     Priority: Medium     Morbid obesity (H)  BMI 40-50 03/12/2019     Priority: Medium     Elevated AST (SGOT) 03/12/2019     Priority: Medium     Elevated blood pressure reading without diagnosis of hypertension 09/21/2016     Priority: Medium     Obesity, Class III, BMI 40-49.9 (morbid obesity) (H) 11/25/2014     Priority: Medium       Family History   Problem Relation Age of Onset     Cancer Mother 80        liver/bile duct cancer     Hypertension Mother      Diabetes Type 2  Mother      Liver Cancer Father 40       Social History     Socioeconomic History     Marital status:      Spouse name: Not on file     Number of children: Not on file     Years of education: Not on file     Highest education level: Not on file   Occupational History     Not on file   Social Needs     Financial resource strain: Not on file     Food insecurity     Worry: Not on file     Inability: Not on file     Transportation needs     Medical: Not on file     Non-medical: Not on file   Tobacco Use     Smoking status: Never Smoker     Smokeless tobacco: Never Used   Substance and Sexual Activity     Alcohol use: No     Frequency: Never     Drug use: No     Sexual activity: Yes     Partners: Male     Birth control/protection: Female Surgical   Lifestyle     Physical activity     Days per week: Not on file     Minutes per session: Not on file     Stress: Not on file   Relationships     Social connections     Talks on phone: Not on file     Gets together: Not on file     Attends [employer] service: Not on file     Active  "member of club or organization: Not on file     Attends meetings of clubs or organizations: Not on file     Relationship status: Not on file     Intimate partner violence     Fear of current or ex partner: Not on file     Emotionally abused: Not on file     Physically abused: Not on file     Forced sexual activity: Not on file   Other Topics Concern     Not on file   Social History Narrative     Not on file       Past Surgical History:   Procedure Laterality Date     TUBAL LIGATION  1987               Review of Systems   Musculoskeletal: Positive for joint pain.   All other systems reviewed and are negative.        Physical Exam  Musculoskeletal:      Left knee: She exhibits effusion.        /70   Ht 1.638 m (5' 4.5\")   Wt 129.3 kg (285 lb)   LMP  (LMP Unknown)   BMI 48.16 kg/m    Constitutional:well-developed, well-nourished, and in no distress.   Cardiovascular: Intact distal pulses.    Neurological: alert. Gait Normal:   Gait, station, stance, and balance appear normal for age  Skin: Skin is warm and dry.   Psychiatric: Mood and affect normal.   Respiratory: unlabored, speaks in full sentences  Lymph: no LAD, no lymphangitis        Left Knee Exam     Tenderness   The patient is experiencing no tenderness.     Range of Motion   Extension: normal   Flexion: abnormal Left knee flexion: Pain at end range of full flexion.     Tests   Asuncion:  Medial - negative Lateral - negative  Varus: negative Valgus: negative  Drawer:  Anterior - negative     Posterior - negative  Patellar apprehension: negative    Other   Erythema: absent  Scars: absent  Sensation: normal  Pulse: present  Swelling: mild  Effusion: effusion present    Comments:  Crepitus with range of motion                 X-ray images Ordered and independently reviewed by me in the office today with the patient. X-ray shows:   Recent Results (from the past 48 hour(s))   XR Knee Standing AP Bilat [address] Bilat Lat Left    Narrative    6/9/2020    Mild " degenerative changes noted by the mild tibial spine spurring on the   left knee, with osteophytic spurring also of the lateral patella   bilaterally and the superior patella on the left.  Otherwise no acute   fractures or soft tissue deformities.             Large Joint Injection/Arthocentesis: L knee joint    Date/Time: 6/9/2020 12:01 PM  Performed by: Rustam Medellin MD  Authorized by: Rustam Medellin MD     Indications:  Pain  Needle Size:  25 G  Guidance: landmark guided    Approach:  Anterolateral  Location:  Knee      Medications:  40 mg triamcinolone 40 MG/ML; 3 mL ropivacaine 5 MG/ML  Medications comment:  4 mL were used of the Ropivacaine  Outcome:  Tolerated well, no immediate complications  Procedure discussed: discussed risks, benefits, and alternatives    Consent Given by:  Patient  Timeout: timeout called immediately prior to procedure    Prep: patient was prepped and draped in usual sterile fashion              Again, thank you for allowing me to participate in the care of your patient.        Sincerely,        Rustam Medellin MD

## 2021-01-15 ENCOUNTER — HEALTH MAINTENANCE LETTER (OUTPATIENT)
Age: 61
End: 2021-01-15

## 2021-07-16 ASSESSMENT — ENCOUNTER SYMPTOMS
HEADACHES: 0
HEARTBURN: 0
PALPITATIONS: 0
NAUSEA: 0
DIARRHEA: 0
SHORTNESS OF BREATH: 0
BREAST MASS: 0
FEVER: 0
PARESTHESIAS: 0
DYSURIA: 0
CONSTIPATION: 0
ARTHRALGIAS: 0
FREQUENCY: 0
SORE THROAT: 0
HEMATURIA: 0
HEMATOCHEZIA: 0
EYE PAIN: 0
NERVOUS/ANXIOUS: 0
WEAKNESS: 0
ABDOMINAL PAIN: 0
COUGH: 0
MYALGIAS: 0
DIZZINESS: 0
CHILLS: 0
JOINT SWELLING: 0

## 2021-08-03 ENCOUNTER — OFFICE VISIT (OUTPATIENT)
Dept: FAMILY MEDICINE | Facility: CLINIC | Age: 61
End: 2021-08-03
Payer: COMMERCIAL

## 2021-08-03 VITALS
HEIGHT: 64 IN | TEMPERATURE: 98.3 F | DIASTOLIC BLOOD PRESSURE: 78 MMHG | RESPIRATION RATE: 16 BRPM | WEIGHT: 270 LBS | BODY MASS INDEX: 46.1 KG/M2 | SYSTOLIC BLOOD PRESSURE: 144 MMHG | OXYGEN SATURATION: 99 % | HEART RATE: 75 BPM

## 2021-08-03 DIAGNOSIS — Z12.11 SPECIAL SCREENING FOR MALIGNANT NEOPLASMS, COLON: ICD-10-CM

## 2021-08-03 DIAGNOSIS — R03.0 ELEVATED BLOOD PRESSURE READING WITHOUT DIAGNOSIS OF HYPERTENSION: ICD-10-CM

## 2021-08-03 DIAGNOSIS — E66.813 CLASS 3 SEVERE OBESITY DUE TO EXCESS CALORIES WITHOUT SERIOUS COMORBIDITY WITH BODY MASS INDEX (BMI) OF 45.0 TO 49.9 IN ADULT (H): ICD-10-CM

## 2021-08-03 DIAGNOSIS — E66.01 CLASS 3 SEVERE OBESITY DUE TO EXCESS CALORIES WITHOUT SERIOUS COMORBIDITY WITH BODY MASS INDEX (BMI) OF 45.0 TO 49.9 IN ADULT (H): ICD-10-CM

## 2021-08-03 DIAGNOSIS — E55.9 HYPOVITAMINOSIS D: ICD-10-CM

## 2021-08-03 DIAGNOSIS — M25.562 CHRONIC PAIN OF LEFT KNEE: ICD-10-CM

## 2021-08-03 DIAGNOSIS — Z00.00 ROUTINE GENERAL MEDICAL EXAMINATION AT A HEALTH CARE FACILITY: Primary | ICD-10-CM

## 2021-08-03 DIAGNOSIS — G89.29 CHRONIC PAIN OF LEFT KNEE: ICD-10-CM

## 2021-08-03 DIAGNOSIS — Z12.31 ENCOUNTER FOR SCREENING MAMMOGRAM FOR BREAST CANCER: ICD-10-CM

## 2021-08-03 LAB
ERYTHROCYTE [DISTWIDTH] IN BLOOD BY AUTOMATED COUNT: 15.3 % (ref 10–15)
HBA1C MFR BLD: 5.5 % (ref 0–5.6)
HCT VFR BLD AUTO: 41.9 % (ref 35–47)
HGB BLD-MCNC: 13.1 G/DL (ref 11.7–15.7)
HOLD SPECIMEN: NORMAL
MCH RBC QN AUTO: 26.7 PG (ref 26.5–33)
MCHC RBC AUTO-ENTMCNC: 31.3 G/DL (ref 31.5–36.5)
MCV RBC AUTO: 86 FL (ref 78–100)
PLATELET # BLD AUTO: 191 10E3/UL (ref 150–450)
RBC # BLD AUTO: 4.9 10E6/UL (ref 3.8–5.2)
WBC # BLD AUTO: 6.8 10E3/UL (ref 4–11)

## 2021-08-03 PROCEDURE — 85027 COMPLETE CBC AUTOMATED: CPT | Performed by: STUDENT IN AN ORGANIZED HEALTH CARE EDUCATION/TRAINING PROGRAM

## 2021-08-03 PROCEDURE — 80053 COMPREHEN METABOLIC PANEL: CPT | Performed by: STUDENT IN AN ORGANIZED HEALTH CARE EDUCATION/TRAINING PROGRAM

## 2021-08-03 PROCEDURE — 80061 LIPID PANEL: CPT | Performed by: STUDENT IN AN ORGANIZED HEALTH CARE EDUCATION/TRAINING PROGRAM

## 2021-08-03 PROCEDURE — 84443 ASSAY THYROID STIM HORMONE: CPT | Performed by: STUDENT IN AN ORGANIZED HEALTH CARE EDUCATION/TRAINING PROGRAM

## 2021-08-03 PROCEDURE — 82306 VITAMIN D 25 HYDROXY: CPT | Performed by: STUDENT IN AN ORGANIZED HEALTH CARE EDUCATION/TRAINING PROGRAM

## 2021-08-03 PROCEDURE — 36415 COLL VENOUS BLD VENIPUNCTURE: CPT | Performed by: STUDENT IN AN ORGANIZED HEALTH CARE EDUCATION/TRAINING PROGRAM

## 2021-08-03 PROCEDURE — 99213 OFFICE O/P EST LOW 20 MIN: CPT | Mod: 25 | Performed by: STUDENT IN AN ORGANIZED HEALTH CARE EDUCATION/TRAINING PROGRAM

## 2021-08-03 PROCEDURE — 83036 HEMOGLOBIN GLYCOSYLATED A1C: CPT | Performed by: STUDENT IN AN ORGANIZED HEALTH CARE EDUCATION/TRAINING PROGRAM

## 2021-08-03 PROCEDURE — 99396 PREV VISIT EST AGE 40-64: CPT | Performed by: STUDENT IN AN ORGANIZED HEALTH CARE EDUCATION/TRAINING PROGRAM

## 2021-08-03 ASSESSMENT — ENCOUNTER SYMPTOMS
PARESTHESIAS: 0
MYALGIAS: 0
FEVER: 0
HEMATURIA: 0
DYSURIA: 0
SHORTNESS OF BREATH: 0
PALPITATIONS: 0
COUGH: 0
CONSTIPATION: 0
ABDOMINAL PAIN: 0
JOINT SWELLING: 0
DIZZINESS: 0
DIARRHEA: 0
WEAKNESS: 0
SORE THROAT: 0
ARTHRALGIAS: 0
EYE PAIN: 0
FREQUENCY: 0
NERVOUS/ANXIOUS: 0
BREAST MASS: 0
HEMATOCHEZIA: 0
HEARTBURN: 0
HEADACHES: 0
CHILLS: 0
NAUSEA: 0

## 2021-08-03 ASSESSMENT — MIFFLIN-ST. JEOR: SCORE: 1789.71

## 2021-08-03 NOTE — PROGRESS NOTES
SUBJECTIVE:   CC: Maida Jarrett is an 60 year old woman who presents for preventive health visit.     Patient has been advised of split billing requirements and indicates understanding: Yes  Healthy Habits:     Getting at least 3 servings of Calcium per day:  Yes    Bi-annual eye exam:  NO    Dental care twice a year:  NO    Sleep apnea or symptoms of sleep apnea:  None    Diet:  Regular (no restrictions) and Vegetarian/vegan    Frequency of exercise:  4-5 days/week    Duration of exercise:  45-60 minutes    Taking medications regularly:  Yes    Medication side effects:  Not applicable    PHQ-2 Total Score: 0    Additional concerns today:  No    Chronic conditions:    H/o vitamin D deficiency.   H/o anemia but she attributes this to having menstruation. Periods stopped in early 50s.  H/o elevated blood pressure - Wears a Fitbit and tells me blood pressures are always good. Tells me today is an outlier. Typically runs 110s/70s.     Acute concern: Also would like referral for consideration of HLA injection left knee for arthritis. Has recurrent pain similar to that in past. Previously had a steroid injection that was helpful. Now having recurrent aching at end of day, with prolonged standing or sitting. Has not had redness, warmth, fever, new injury.     Exercise - walking, personal training, strength training. Eating nutritious foods mostly. She is working on this. Lost 15 pounds since last year.     HCM: UTD on pap--last done in 2019 and NSIL, negative HPV.   Mammogram normal in 6/2019.   Colon cancer screening: Colonoscopy due.     Works in a residential living facility working night shifts. She has two grown kids and three grandchildren.        Today's PHQ-2 Score:   PHQ-2 ( 1999 [employer]) 7/16/2021   Q1: Little interest or pleasure in doing things 0   Q2: Feeling down, depressed or hopeless 0   PHQ-2 Score 0   Q1: Little interest or pleasure in doing things Not at all   Q2: Feeling down, depressed or hopeless  Not at all   PHQ-2 Score 0       Abuse: Current or Past (Physical, Sexual or Emotional) - No  Do you feel safe in your environment? Yes    Have you ever done Advance Care Planning? (For example, a Health Directive, POLST, or a discussion with a medical provider or your loved ones about your wishes): No, advance care planning information given to patient to review.  Patient plans to discuss their wishes with loved ones or provider.      Social History     Tobacco Use     Smoking status: Never Smoker     Smokeless tobacco: Never Used   Substance Use Topics     Alcohol use: No       Alcohol Use 7/16/2021   Prescreen: >3 drinks/day or >7 drinks/week? No   Prescreen: >3 drinks/day or >7 drinks/week? -       Reviewed orders with patient.  Reviewed health maintenance and updated orders accordingly - Yes  Lab work is in process    Breast Cancer Screening:    Breast CA Risk Assessment (FHS-7) 7/16/2021 7/16/2021   Do you have a family history of breast, colon, or ovarian cancer? No / Unknown No / Unknown     Mammogram Screening: Recommended mammography every 1-2 years with patient discussion and risk factor consideration  Pertinent mammograms are reviewed under the imaging tab.    History of abnormal Pap smear: NO - age 30-65 PAP every 5 years with negative HPV co-testing recommended  PAP / HPV Latest Ref Rng & Units 5/10/2019   PAP (Historical) - NIL   HPV16 NEG:Negative Negative   HPV18 NEG:Negative Negative   HRHPV NEG:Negative Negative     Reviewed and updated as needed this visit by clinical staff  Tobacco  Allergies  Meds  Problems  Med Hx  Surg Hx  Fam Hx  Soc Hx          Reviewed and updated as needed this visit by Provider    Meds  Problems                Review of Systems   Constitutional: Negative for chills and fever.   HENT: Negative for congestion, ear pain and sore throat.    Eyes: Negative for pain and visual disturbance.   Respiratory: Negative for cough and shortness of breath.    Cardiovascular:  "Negative for chest pain, palpitations and peripheral edema.   Gastrointestinal: Negative for abdominal pain, constipation, diarrhea, heartburn, hematochezia and nausea.   Breasts:  Positive for tenderness. Negative for breast mass and discharge.   Genitourinary: Negative for dysuria, frequency, genital sores, hematuria, pelvic pain, urgency, vaginal bleeding and vaginal discharge.   Musculoskeletal: Negative for arthralgias, joint swelling and myalgias.   Skin: Negative for rash.   Neurological: Negative for dizziness, weakness, headaches and paresthesias.   Psychiatric/Behavioral: Negative for mood changes. The patient is not nervous/anxious.        OBJECTIVE:   BP (!) 144/78   Pulse 75   Temp 98.3  F (36.8  C) (Oral)   Resp 16   Ht 1.626 m (5' 4\")   Wt 122.5 kg (270 lb)   LMP  (LMP Unknown)   SpO2 99%   Breastfeeding No   BMI 46.35 kg/m    Physical Exam  GENERAL: healthy, alert and no distress  EYES: Eyes grossly normal to inspection, PERRL and conjunctivae and sclerae normal  HENT: ear canals and TM's normal, nose and mouth without ulcers or lesions  NECK: no adenopathy, no asymmetry, masses, or scars and thyroid normal to palpation  RESP: lungs clear to auscultation - no rales, rhonchi or wheezes  BREAST: normal without masses, tenderness or nipple discharge and no palpable axillary masses or adenopathy  CV: regular rate and rhythm, normal S1 S2, no S3 or S4, no murmur, click or rub, no peripheral edema and peripheral pulses strong  ABDOMEN: soft, nontender, no hepatosplenomegaly, no masses and bowel sounds normal  MS: no gross musculoskeletal defects noted, no edema  SKIN: no suspicious lesions or rashes  NEURO: Normal strength and tone, mentation intact and speech normal  PSYCH: mentation appears normal, affect normal/bright    Diagnostic Test Results:  Labs reviewed in Epic    ASSESSMENT/PLAN:   1. Routine general medical examination at a health care facility  Other topics addressed today include " "knee pain, elevated blood pressure. Discussed close monitoring of blood pressure and return if elevated. Home checks are 110/70-80s.     2. Chronic pain of left knee  Referral to sports medicine for knee arthritis.   - Orthopedic  Referral; Future    3. Encounter for screening mammogram for breast cancer  - *MA Screening Digital Bilateral; Future    4. Special screening for malignant neoplasms, colon  - Adult Gastro Ref - Procedure Only; Future    5. Class 3 severe obesity due to excess calories without serious comorbidity with body mass index (BMI) of 45.0 to 49.9 in adult (H)  Congratulated her on losing 15 lbs since last year. Continue great efforts. Due for labs.   - CBC with Platelets and Reflex to Iron Studies; Future  - TSH with free T4 reflex; Future  - Hemoglobin A1c; Future  - Lipid panel reflex to direct LDL Non-fasting; Future  - Comprehensive metabolic panel (BMP + Alb, Alk Phos, ALT, AST, Total. Bili, TP); Future    6. Hypovitaminosis D  Due for recheck. She is not supplementing.   - Vitamin D Deficiency; Future    Patient has been advised of split billing requirements and indicates understanding: Yes  COUNSELING:  Reviewed preventive health counseling, as reflected in patient instructions    Estimated body mass index is 46.35 kg/m  as calculated from the following:    Height as of this encounter: 1.626 m (5' 4\").    Weight as of this encounter: 122.5 kg (270 lb).    Weight management plan: Discussed healthy diet and exercise guidelines    She reports that she has never smoked. She has never used smokeless tobacco.    Counseling Resources:  ATP IV Guidelines  Pooled Cohorts Equation Calculator  Breast Cancer Risk Calculator  BRCA-Related Cancer Risk Assessment: FHS-7 Tool  FRAX Risk Assessment  ICSI Preventive Guidelines  Dietary Guidelines for Americans, 2010  USDA's MyPlate  ASA Prophylaxis  Lung CA Screening    Rupa Reyes MD  [facility]  "

## 2021-08-04 ENCOUNTER — LAB REQUISITION (OUTPATIENT)
Dept: LAB | Facility: CLINIC | Age: 61
End: 2021-08-04

## 2021-08-04 LAB
ALBUMIN SERPL-MCNC: 3.6 G/DL (ref 3.4–5)
ALP SERPL-CCNC: 76 U/L (ref 40–150)
ALT SERPL W P-5'-P-CCNC: 28 U/L (ref 0–50)
ANION GAP SERPL CALCULATED.3IONS-SCNC: 5 MMOL/L (ref 3–14)
AST SERPL W P-5'-P-CCNC: 57 U/L (ref 0–45)
BILIRUB SERPL-MCNC: 0.4 MG/DL (ref 0.2–1.3)
BUN SERPL-MCNC: 13 MG/DL (ref 7–30)
CALCIUM SERPL-MCNC: 9.7 MG/DL (ref 8.5–10.1)
CHLORIDE BLD-SCNC: 107 MMOL/L (ref 94–109)
CHOLEST SERPL-MCNC: 207 MG/DL
CO2 SERPL-SCNC: 26 MMOL/L (ref 20–32)
CREAT SERPL-MCNC: 0.85 MG/DL (ref 0.52–1.04)
DEPRECATED CALCIDIOL+CALCIFEROL SERPL-MC: 13 UG/L (ref 20–75)
FASTING STATUS PATIENT QL REPORTED: YES
GFR SERPL CREATININE-BSD FRML MDRD: 76 ML/MIN/1.73M2
GLUCOSE BLD-MCNC: 101 MG/DL (ref 70–99)
HDLC SERPL-MCNC: 81 MG/DL
LDLC SERPL CALC-MCNC: 106 MG/DL
NONHDLC SERPL-MCNC: 126 MG/DL
POTASSIUM BLD-SCNC: 3.9 MMOL/L (ref 3.4–5.3)
PROT SERPL-MCNC: 8.7 G/DL (ref 6.8–8.8)
SODIUM SERPL-SCNC: 138 MMOL/L (ref 133–144)
TRIGL SERPL-MCNC: 98 MG/DL
TSH SERPL DL<=0.005 MIU/L-ACNC: 1.35 MU/L (ref 0.4–4)

## 2021-08-04 PROCEDURE — U0005 INFEC AGEN DETEC AMPLI PROBE: HCPCS | Performed by: INTERNAL MEDICINE

## 2021-08-05 LAB — SARS-COV-2 RNA RESP QL NAA+PROBE: NEGATIVE

## 2021-08-20 PROCEDURE — U0005 INFEC AGEN DETEC AMPLI PROBE: HCPCS | Performed by: INTERNAL MEDICINE

## 2021-08-21 ENCOUNTER — LAB REQUISITION (OUTPATIENT)
Dept: LAB | Facility: CLINIC | Age: 61
End: 2021-08-21

## 2021-08-22 LAB — SARS-COV-2 RNA RESP QL NAA+PROBE: NEGATIVE

## 2021-09-01 ENCOUNTER — LAB REQUISITION (OUTPATIENT)
Dept: LAB | Facility: CLINIC | Age: 61
End: 2021-09-01

## 2021-09-01 PROCEDURE — U0003 INFECTIOUS AGENT DETECTION BY NUCLEIC ACID (DNA OR RNA); SEVERE ACUTE RESPIRATORY SYNDROME CORONAVIRUS 2 (SARS-COV-2) (CORONAVIRUS DISEASE [COVID-19]), AMPLIFIED PROBE TECHNIQUE, MAKING USE OF HIGH THROUGHPUT TECHNOLOGIES AS DESCRIBED BY CMS-2020-01-R: HCPCS | Performed by: INTERNAL MEDICINE

## 2021-09-02 LAB — SARS-COV-2 RNA RESP QL NAA+PROBE: NEGATIVE

## 2021-09-06 PROCEDURE — U0003 INFECTIOUS AGENT DETECTION BY NUCLEIC ACID (DNA OR RNA); SEVERE ACUTE RESPIRATORY SYNDROME CORONAVIRUS 2 (SARS-COV-2) (CORONAVIRUS DISEASE [COVID-19]), AMPLIFIED PROBE TECHNIQUE, MAKING USE OF HIGH THROUGHPUT TECHNOLOGIES AS DESCRIBED BY CMS-2020-01-R: HCPCS | Performed by: INTERNAL MEDICINE

## 2021-09-09 ENCOUNTER — LAB REQUISITION (OUTPATIENT)
Dept: LAB | Facility: CLINIC | Age: 61
End: 2021-09-09

## 2021-09-10 LAB — SARS-COV-2 RNA RESP QL NAA+PROBE: NEGATIVE

## 2021-09-14 PROCEDURE — U0005 INFEC AGEN DETEC AMPLI PROBE: HCPCS | Performed by: INTERNAL MEDICINE

## 2021-09-15 ENCOUNTER — LAB REQUISITION (OUTPATIENT)
Dept: LAB | Facility: CLINIC | Age: 61
End: 2021-09-15

## 2021-09-16 LAB — SARS-COV-2 RNA RESP QL NAA+PROBE: NEGATIVE

## 2021-09-20 ENCOUNTER — LAB REQUISITION (OUTPATIENT)
Dept: LAB | Facility: CLINIC | Age: 61
End: 2021-09-20

## 2021-09-20 PROCEDURE — U0003 INFECTIOUS AGENT DETECTION BY NUCLEIC ACID (DNA OR RNA); SEVERE ACUTE RESPIRATORY SYNDROME CORONAVIRUS 2 (SARS-COV-2) (CORONAVIRUS DISEASE [COVID-19]), AMPLIFIED PROBE TECHNIQUE, MAKING USE OF HIGH THROUGHPUT TECHNOLOGIES AS DESCRIBED BY CMS-2020-01-R: HCPCS | Performed by: INTERNAL MEDICINE

## 2021-09-21 LAB — SARS-COV-2 RNA RESP QL NAA+PROBE: NEGATIVE

## 2021-09-23 ENCOUNTER — LAB REQUISITION (OUTPATIENT)
Dept: LAB | Facility: CLINIC | Age: 61
End: 2021-09-23

## 2021-09-23 PROCEDURE — U0005 INFEC AGEN DETEC AMPLI PROBE: HCPCS | Performed by: INTERNAL MEDICINE

## 2021-09-24 LAB — SARS-COV-2 RNA RESP QL NAA+PROBE: NEGATIVE

## 2021-09-28 PROCEDURE — U0003 INFECTIOUS AGENT DETECTION BY NUCLEIC ACID (DNA OR RNA); SEVERE ACUTE RESPIRATORY SYNDROME CORONAVIRUS 2 (SARS-COV-2) (CORONAVIRUS DISEASE [COVID-19]), AMPLIFIED PROBE TECHNIQUE, MAKING USE OF HIGH THROUGHPUT TECHNOLOGIES AS DESCRIBED BY CMS-2020-01-R: HCPCS | Performed by: INTERNAL MEDICINE

## 2021-09-29 ENCOUNTER — LAB REQUISITION (OUTPATIENT)
Dept: LAB | Facility: CLINIC | Age: 61
End: 2021-09-29

## 2021-10-01 LAB — SARS-COV-2 RNA RESP QL NAA+PROBE: NOT DETECTED

## 2021-10-04 ENCOUNTER — LAB REQUISITION (OUTPATIENT)
Dept: LAB | Facility: CLINIC | Age: 61
End: 2021-10-04

## 2021-10-04 PROCEDURE — U0003 INFECTIOUS AGENT DETECTION BY NUCLEIC ACID (DNA OR RNA); SEVERE ACUTE RESPIRATORY SYNDROME CORONAVIRUS 2 (SARS-COV-2) (CORONAVIRUS DISEASE [COVID-19]), AMPLIFIED PROBE TECHNIQUE, MAKING USE OF HIGH THROUGHPUT TECHNOLOGIES AS DESCRIBED BY CMS-2020-01-R: HCPCS | Performed by: INTERNAL MEDICINE

## 2021-10-06 LAB — SARS-COV-2 RNA RESP QL NAA+PROBE: NOT DETECTED

## 2021-10-07 ENCOUNTER — LAB REQUISITION (OUTPATIENT)
Dept: LAB | Facility: CLINIC | Age: 61
End: 2021-10-07

## 2021-10-07 PROCEDURE — U0003 INFECTIOUS AGENT DETECTION BY NUCLEIC ACID (DNA OR RNA); SEVERE ACUTE RESPIRATORY SYNDROME CORONAVIRUS 2 (SARS-COV-2) (CORONAVIRUS DISEASE [COVID-19]), AMPLIFIED PROBE TECHNIQUE, MAKING USE OF HIGH THROUGHPUT TECHNOLOGIES AS DESCRIBED BY CMS-2020-01-R: HCPCS | Performed by: INTERNAL MEDICINE

## 2021-10-08 LAB — SARS-COV-2 RNA RESP QL NAA+PROBE: NEGATIVE

## 2021-10-12 ENCOUNTER — LAB REQUISITION (OUTPATIENT)
Dept: LAB | Facility: CLINIC | Age: 61
End: 2021-10-12

## 2021-10-12 PROCEDURE — U0005 INFEC AGEN DETEC AMPLI PROBE: HCPCS | Performed by: INTERNAL MEDICINE

## 2021-10-13 LAB — SARS-COV-2 RNA RESP QL NAA+PROBE: NEGATIVE

## 2021-10-21 ENCOUNTER — LAB REQUISITION (OUTPATIENT)
Dept: LAB | Facility: CLINIC | Age: 61
End: 2021-10-21

## 2021-10-21 LAB — SARS-COV-2 RNA RESP QL NAA+PROBE: NEGATIVE

## 2021-10-21 PROCEDURE — U0003 INFECTIOUS AGENT DETECTION BY NUCLEIC ACID (DNA OR RNA); SEVERE ACUTE RESPIRATORY SYNDROME CORONAVIRUS 2 (SARS-COV-2) (CORONAVIRUS DISEASE [COVID-19]), AMPLIFIED PROBE TECHNIQUE, MAKING USE OF HIGH THROUGHPUT TECHNOLOGIES AS DESCRIBED BY CMS-2020-01-R: HCPCS | Performed by: INTERNAL MEDICINE

## 2021-10-24 ENCOUNTER — HEALTH MAINTENANCE LETTER (OUTPATIENT)
Age: 61
End: 2021-10-24

## 2021-10-26 ENCOUNTER — LAB REQUISITION (OUTPATIENT)
Dept: LAB | Facility: CLINIC | Age: 61
End: 2021-10-26

## 2021-10-26 PROCEDURE — U0003 INFECTIOUS AGENT DETECTION BY NUCLEIC ACID (DNA OR RNA); SEVERE ACUTE RESPIRATORY SYNDROME CORONAVIRUS 2 (SARS-COV-2) (CORONAVIRUS DISEASE [COVID-19]), AMPLIFIED PROBE TECHNIQUE, MAKING USE OF HIGH THROUGHPUT TECHNOLOGIES AS DESCRIBED BY CMS-2020-01-R: HCPCS | Performed by: INTERNAL MEDICINE

## 2021-10-27 LAB — SARS-COV-2 RNA RESP QL NAA+PROBE: NEGATIVE

## 2021-10-29 PROCEDURE — U0003 INFECTIOUS AGENT DETECTION BY NUCLEIC ACID (DNA OR RNA); SEVERE ACUTE RESPIRATORY SYNDROME CORONAVIRUS 2 (SARS-COV-2) (CORONAVIRUS DISEASE [COVID-19]), AMPLIFIED PROBE TECHNIQUE, MAKING USE OF HIGH THROUGHPUT TECHNOLOGIES AS DESCRIBED BY CMS-2020-01-R: HCPCS | Performed by: INTERNAL MEDICINE

## 2021-10-30 ENCOUNTER — LAB REQUISITION (OUTPATIENT)
Dept: LAB | Facility: CLINIC | Age: 61
End: 2021-10-30

## 2021-10-30 DIAGNOSIS — Z00.00 ENCOUNTER FOR GENERAL ADULT MEDICAL EXAMINATION WITHOUT ABNORMAL FINDINGS: ICD-10-CM

## 2021-10-31 LAB — SARS-COV-2 RNA RESP QL NAA+PROBE: NEGATIVE

## 2021-11-01 ENCOUNTER — LAB REQUISITION (OUTPATIENT)
Dept: LAB | Facility: CLINIC | Age: 61
End: 2021-11-01

## 2021-11-01 PROCEDURE — U0003 INFECTIOUS AGENT DETECTION BY NUCLEIC ACID (DNA OR RNA); SEVERE ACUTE RESPIRATORY SYNDROME CORONAVIRUS 2 (SARS-COV-2) (CORONAVIRUS DISEASE [COVID-19]), AMPLIFIED PROBE TECHNIQUE, MAKING USE OF HIGH THROUGHPUT TECHNOLOGIES AS DESCRIBED BY CMS-2020-01-R: HCPCS | Performed by: INTERNAL MEDICINE

## 2021-11-02 ENCOUNTER — LAB REQUISITION (OUTPATIENT)
Dept: LAB | Facility: CLINIC | Age: 61
End: 2021-11-02

## 2021-11-02 LAB — SARS-COV-2 RNA RESP QL NAA+PROBE: NEGATIVE

## 2021-11-02 PROCEDURE — U0003 INFECTIOUS AGENT DETECTION BY NUCLEIC ACID (DNA OR RNA); SEVERE ACUTE RESPIRATORY SYNDROME CORONAVIRUS 2 (SARS-COV-2) (CORONAVIRUS DISEASE [COVID-19]), AMPLIFIED PROBE TECHNIQUE, MAKING USE OF HIGH THROUGHPUT TECHNOLOGIES AS DESCRIBED BY CMS-2020-01-R: HCPCS | Performed by: INTERNAL MEDICINE

## 2021-11-03 LAB — SARS-COV-2 RNA RESP QL NAA+PROBE: NEGATIVE

## 2021-11-06 PROCEDURE — U0005 INFEC AGEN DETEC AMPLI PROBE: HCPCS | Performed by: INTERNAL MEDICINE

## 2021-11-07 ENCOUNTER — LAB REQUISITION (OUTPATIENT)
Dept: LAB | Facility: CLINIC | Age: 61
End: 2021-11-07

## 2021-11-08 LAB — SARS-COV-2 RNA RESP QL NAA+PROBE: NEGATIVE

## 2021-11-09 ENCOUNTER — LAB REQUISITION (OUTPATIENT)
Dept: LAB | Facility: CLINIC | Age: 61
End: 2021-11-09

## 2021-11-09 PROCEDURE — U0003 INFECTIOUS AGENT DETECTION BY NUCLEIC ACID (DNA OR RNA); SEVERE ACUTE RESPIRATORY SYNDROME CORONAVIRUS 2 (SARS-COV-2) (CORONAVIRUS DISEASE [COVID-19]), AMPLIFIED PROBE TECHNIQUE, MAKING USE OF HIGH THROUGHPUT TECHNOLOGIES AS DESCRIBED BY CMS-2020-01-R: HCPCS | Performed by: INTERNAL MEDICINE

## 2021-11-11 LAB — SARS-COV-2 RNA RESP QL NAA+PROBE: NOT DETECTED

## 2021-11-12 ENCOUNTER — LAB REQUISITION (OUTPATIENT)
Dept: LAB | Facility: CLINIC | Age: 61
End: 2021-11-12

## 2021-11-12 PROCEDURE — U0005 INFEC AGEN DETEC AMPLI PROBE: HCPCS | Performed by: INTERNAL MEDICINE

## 2021-11-14 LAB — SARS-COV-2 RNA RESP QL NAA+PROBE: NEGATIVE

## 2021-11-15 ENCOUNTER — LAB REQUISITION (OUTPATIENT)
Dept: LAB | Facility: CLINIC | Age: 61
End: 2021-11-15

## 2021-11-15 PROCEDURE — U0003 INFECTIOUS AGENT DETECTION BY NUCLEIC ACID (DNA OR RNA); SEVERE ACUTE RESPIRATORY SYNDROME CORONAVIRUS 2 (SARS-COV-2) (CORONAVIRUS DISEASE [COVID-19]), AMPLIFIED PROBE TECHNIQUE, MAKING USE OF HIGH THROUGHPUT TECHNOLOGIES AS DESCRIBED BY CMS-2020-01-R: HCPCS | Performed by: INTERNAL MEDICINE

## 2021-11-16 ENCOUNTER — LAB REQUISITION (OUTPATIENT)
Dept: LAB | Facility: CLINIC | Age: 61
End: 2021-11-16

## 2021-11-16 LAB
SARS-COV-2 RNA RESP QL NAA+PROBE: NORMAL
SARS-COV-2 RNA RESP QL NAA+PROBE: NOT DETECTED

## 2021-11-16 PROCEDURE — U0005 INFEC AGEN DETEC AMPLI PROBE: HCPCS | Performed by: INTERNAL MEDICINE

## 2021-11-17 LAB — SARS-COV-2 RNA RESP QL NAA+PROBE: NEGATIVE

## 2021-11-23 ENCOUNTER — LAB REQUISITION (OUTPATIENT)
Dept: LAB | Facility: CLINIC | Age: 61
End: 2021-11-23

## 2021-11-23 PROCEDURE — U0005 INFEC AGEN DETEC AMPLI PROBE: HCPCS | Performed by: INTERNAL MEDICINE

## 2021-11-24 LAB — SARS-COV-2 RNA RESP QL NAA+PROBE: NEGATIVE

## 2021-11-29 ENCOUNTER — LAB REQUISITION (OUTPATIENT)
Dept: LAB | Facility: CLINIC | Age: 61
End: 2021-11-29

## 2021-11-29 PROCEDURE — U0005 INFEC AGEN DETEC AMPLI PROBE: HCPCS | Performed by: INTERNAL MEDICINE

## 2021-11-30 LAB — SARS-COV-2 RNA RESP QL NAA+PROBE: NEGATIVE

## 2021-12-02 ENCOUNTER — LAB REQUISITION (OUTPATIENT)
Dept: LAB | Facility: CLINIC | Age: 61
End: 2021-12-02

## 2021-12-02 PROCEDURE — U0003 INFECTIOUS AGENT DETECTION BY NUCLEIC ACID (DNA OR RNA); SEVERE ACUTE RESPIRATORY SYNDROME CORONAVIRUS 2 (SARS-COV-2) (CORONAVIRUS DISEASE [COVID-19]), AMPLIFIED PROBE TECHNIQUE, MAKING USE OF HIGH THROUGHPUT TECHNOLOGIES AS DESCRIBED BY CMS-2020-01-R: HCPCS | Performed by: INTERNAL MEDICINE

## 2021-12-03 LAB — SARS-COV-2 RNA RESP QL NAA+PROBE: NEGATIVE

## 2021-12-07 ENCOUNTER — LAB REQUISITION (OUTPATIENT)
Dept: LAB | Facility: CLINIC | Age: 61
End: 2021-12-07

## 2021-12-07 PROCEDURE — U0005 INFEC AGEN DETEC AMPLI PROBE: HCPCS | Performed by: INTERNAL MEDICINE

## 2021-12-08 LAB — SARS-COV-2 RNA RESP QL NAA+PROBE: NEGATIVE

## 2021-12-10 ENCOUNTER — LAB REQUISITION (OUTPATIENT)
Dept: LAB | Facility: CLINIC | Age: 61
End: 2021-12-10

## 2021-12-10 LAB — SARS-COV-2 RNA RESP QL NAA+PROBE: NEGATIVE

## 2021-12-10 PROCEDURE — U0003 INFECTIOUS AGENT DETECTION BY NUCLEIC ACID (DNA OR RNA); SEVERE ACUTE RESPIRATORY SYNDROME CORONAVIRUS 2 (SARS-COV-2) (CORONAVIRUS DISEASE [COVID-19]), AMPLIFIED PROBE TECHNIQUE, MAKING USE OF HIGH THROUGHPUT TECHNOLOGIES AS DESCRIBED BY CMS-2020-01-R: HCPCS | Performed by: INTERNAL MEDICINE

## 2021-12-14 ENCOUNTER — LAB REQUISITION (OUTPATIENT)
Dept: LAB | Facility: CLINIC | Age: 61
End: 2021-12-14

## 2021-12-14 PROCEDURE — U0005 INFEC AGEN DETEC AMPLI PROBE: HCPCS | Performed by: INTERNAL MEDICINE

## 2021-12-15 LAB — SARS-COV-2 RNA RESP QL NAA+PROBE: NEGATIVE

## 2021-12-16 ENCOUNTER — LAB REQUISITION (OUTPATIENT)
Dept: LAB | Facility: CLINIC | Age: 61
End: 2021-12-16

## 2021-12-16 PROCEDURE — U0003 INFECTIOUS AGENT DETECTION BY NUCLEIC ACID (DNA OR RNA); SEVERE ACUTE RESPIRATORY SYNDROME CORONAVIRUS 2 (SARS-COV-2) (CORONAVIRUS DISEASE [COVID-19]), AMPLIFIED PROBE TECHNIQUE, MAKING USE OF HIGH THROUGHPUT TECHNOLOGIES AS DESCRIBED BY CMS-2020-01-R: HCPCS | Performed by: INTERNAL MEDICINE

## 2021-12-17 LAB — SARS-COV-2 RNA RESP QL NAA+PROBE: NEGATIVE

## 2021-12-21 ENCOUNTER — LAB REQUISITION (OUTPATIENT)
Dept: LAB | Facility: CLINIC | Age: 61
End: 2021-12-21

## 2021-12-21 PROCEDURE — U0005 INFEC AGEN DETEC AMPLI PROBE: HCPCS | Performed by: INTERNAL MEDICINE

## 2021-12-22 LAB — SARS-COV-2 RNA RESP QL NAA+PROBE: NEGATIVE

## 2021-12-27 ENCOUNTER — LAB REQUISITION (OUTPATIENT)
Dept: LAB | Facility: CLINIC | Age: 61
End: 2021-12-27

## 2021-12-27 LAB — SARS-COV-2 RNA RESP QL NAA+PROBE: NEGATIVE

## 2021-12-27 PROCEDURE — U0003 INFECTIOUS AGENT DETECTION BY NUCLEIC ACID (DNA OR RNA); SEVERE ACUTE RESPIRATORY SYNDROME CORONAVIRUS 2 (SARS-COV-2) (CORONAVIRUS DISEASE [COVID-19]), AMPLIFIED PROBE TECHNIQUE, MAKING USE OF HIGH THROUGHPUT TECHNOLOGIES AS DESCRIBED BY CMS-2020-01-R: HCPCS | Performed by: INTERNAL MEDICINE

## 2022-01-10 ENCOUNTER — LAB REQUISITION (OUTPATIENT)
Dept: LAB | Facility: CLINIC | Age: 62
End: 2022-01-10

## 2022-01-10 PROCEDURE — U0003 INFECTIOUS AGENT DETECTION BY NUCLEIC ACID (DNA OR RNA); SEVERE ACUTE RESPIRATORY SYNDROME CORONAVIRUS 2 (SARS-COV-2) (CORONAVIRUS DISEASE [COVID-19]), AMPLIFIED PROBE TECHNIQUE, MAKING USE OF HIGH THROUGHPUT TECHNOLOGIES AS DESCRIBED BY CMS-2020-01-R: HCPCS | Performed by: INTERNAL MEDICINE

## 2022-01-11 LAB — SARS-COV-2 RNA RESP QL NAA+PROBE: NEGATIVE

## 2022-01-21 DIAGNOSIS — Z12.11 SCREEN FOR COLON CANCER: Primary | ICD-10-CM

## 2022-01-27 ENCOUNTER — LAB REQUISITION (OUTPATIENT)
Dept: LAB | Facility: CLINIC | Age: 62
End: 2022-01-27

## 2022-01-27 PROCEDURE — U0003 INFECTIOUS AGENT DETECTION BY NUCLEIC ACID (DNA OR RNA); SEVERE ACUTE RESPIRATORY SYNDROME CORONAVIRUS 2 (SARS-COV-2) (CORONAVIRUS DISEASE [COVID-19]), AMPLIFIED PROBE TECHNIQUE, MAKING USE OF HIGH THROUGHPUT TECHNOLOGIES AS DESCRIBED BY CMS-2020-01-R: HCPCS | Performed by: INTERNAL MEDICINE

## 2022-01-28 LAB — SARS-COV-2 RNA RESP QL NAA+PROBE: NEGATIVE

## 2022-02-13 ENCOUNTER — HEALTH MAINTENANCE LETTER (OUTPATIENT)
Age: 62
End: 2022-02-13

## 2022-03-03 ENCOUNTER — NURSE TRIAGE (OUTPATIENT)
Dept: FAMILY MEDICINE | Facility: CLINIC | Age: 62
End: 2022-03-03
Payer: COMMERCIAL

## 2022-03-03 NOTE — TELEPHONE ENCOUNTER
Pt reports swelling and itching about her eyes that began as a rash ~ 1 week ago.  Has tried hydrocortisone OTC, Neosporin, and Aquaphor with no relief.  Took 25 mg Benadryl today and has seen relief of swelling and itching.      Reports that she takes B12 & D - no change in dosage or brand.  NKDA, denies any new foods, soaps/detergents, or exposure.  Pt denies any SOB throughout.  No vision changes or drainage, sinus/ear pain, headache, dizziness.  Reports sclera are clear, is entirely lid and surrounding area.    Offered earlier virtual appt, also advised of [facility] walk in clinic and UC as options.  Advised to continue Benadryl at 25mg every 6 hours if effective, can increase to 50 mg if needed.  Cool washclothes and avoid further ointments around the eyes.     Rashmi Brown RN  [facility]        Reason for Disposition    [1] MILD eyelid swelling (puffiness) AND [2] persists > 3 days  (Exception: suspect mosquito bites)    Additional Information    Negative: Unresponsive, passed out or very weak    Negative: Difficulty breathing or wheezing    Negative: [1] Difficulty swallowing or slurred speech AND [2] sudden onset    Negative: Sounds like a life-threatening emergency to the triager    Negative: Recent injury to the eye    Negative: Entire face is swollen    Negative: Sacs of clear fluid (blisters) on whites of eyes (allergic cysts)    Negative: Contact with pollen, other allergic substance or eyedrops    Negative: [1] Bee sting AND [2] within last 24 hours    Negative: Insect bite suspected    Negative: Sty suspected (small, painful red lump present on lid margin    Negative: Yellow or green discharge (pus) in the eye    Negative: Redness of white area (sclera) of eye(s)    Negative: [1] SEVERE eyelid swelling (i.e., shut or almost) AND [2] fever    Negative: [1] Eyelid (outer) is very red AND [2] fever    Negative: Patient sounds very sick or weak to the triager    Negative: [1]  "Pregnant > 20 weeks AND [2] sudden weight gain (i.e., more than 3 lbs or 1.4 kg in one week)    Negative: [1] SEVERE eyelid swelling (i.e., shut or almost) AND [2] involves both eyes      (Exception: itchy eyes, which  are probably an allergic reaction)    Negative: [1] SEVERE eyelid swelling (i.e., shut or almost) AND [2] Taking an ACE Inhibitor medication (e.g., benazepril/LOTENSIN, captopril/CAPOTEN, enalapril/VASOTEC, lisinopril/ZESTRIL)    Negative: [1] SEVERE eyelid swelling on one side AND [2] red and painful (or tender to touch)    Negative: [1] SEVERE eyelid swelling on one side AND [2] sinus pain or pressure    Negative: [1] MILD swelling AND [2] fever    Negative: [1] Painful rash AND [2] multiple small blisters grouped together (i.e., dermatomal distribution or \"band\" or \"stripe\")    Negative: [1] SEVERE eyelid swelling (i.e., shut or almost) AND [2] involves both eyes AND [3] itchy    Negative: MODERATE-SEVERE eyelid swelling on one side  (Exception: due to a mosquito bite)    Negative: [1] MILD eyelid swelling (puffiness) AND [2] sinus pain or pressure    Negative: Eyelid is red and painful (or tender to touch)    Negative: Swelling of both lower legs (i.e., bilateral pedal edema)    Protocols used: EYE - SWELLING-A-AH      "

## 2022-03-04 ENCOUNTER — VIRTUAL VISIT (OUTPATIENT)
Dept: FAMILY MEDICINE | Facility: CLINIC | Age: 62
End: 2022-03-04
Payer: COMMERCIAL

## 2022-03-04 DIAGNOSIS — H57.89 PERIORBITAL SWELLING: Primary | ICD-10-CM

## 2022-03-04 PROCEDURE — 99213 OFFICE O/P EST LOW 20 MIN: CPT | Mod: TEL | Performed by: NURSE PRACTITIONER

## 2022-03-04 NOTE — PROGRESS NOTES
"Marilou is a 61 year old who is being evaluated via a billable video visit.      How would you like to obtain your AVS? MyChart  If the video visit is dropped, the invitation should be resent by: Text to cell phone: [phone]  Will anyone else be joining your video visit? No          Assessment & Plan     Periorbital swelling  Unfortunately the patient was unable to get connected to video visit today we had to transition to a phone visit.  Sounds as though patient might be reacting to new hair product.  Her symptoms do sound consistent with an allergic reaction.  Discussed that she could try an allergy medication such as Claritin or Zyrtec.  I also offered to prescribe her Ciprodex eyedrops in the event this was an infection she declined patient states she has an appointment in person on Monday and she would prefer to have her eyes be seen before she starts any treatment.  If her symptoms worsen she can be seen in urgent care over the weekend.               BMI:   Estimated body mass index is 46.35 kg/m  as calculated from the following:    Height as of 8/3/21: 1.626 m (5' 4\").    Weight as of 8/3/21: 122.5 kg (270 lb).           No follow-ups on file.    SHARLA Carbajal CNP  [facility]    Subjective   Marilou is a 61 year old who presents for the following health issues     HPI     Eye(s) Problem  Onset/Duration: About a week ago  Description:   Location: Both eyes  Pain: no  Redness: no  Accompanying Signs & Symptoms:  Discharge/mattering: no  Swelling: YES  Visual changes: no  Fever: no  Nasal Congestion: no  Bothered by bright lights: no  History:  Trauma: no  Foreign body exposure: no  Wearing contacts: no  Precipitating or alleviating factors: None  Therapies tried and outcome: Has been taking benadryl for the last few days which has brought swelling down.    She use Benadryl for 2 days which stopped the itchiness, cold towels have been helping.  The swelling has not gone down.   " Was using a new hair product.  Thinking this could have gotten in her eyes.  Denies any eye redness or discharge.     Unfortunately the patient was unable to get connected to the video visit.  Spent considerable amount of time trying to sign invites both to her phone and email.  She states she has made an appointment on Monday to be seen in person and she would like to have her eyes be looked at.Constitutional, HEENT, cardiovascular, pulmonary, gi and gu systems are negative, except as otherwise noted.    Review of Systems   Constitutional, HEENT, cardiovascular, pulmonary, gi and gu systems are negative, except as otherwise noted.      Objective           Vitals:  No vitals were obtained today due to virtual visit.    Physical Exam   GENERAL: Healthy, alert and no distress  EYES: Eyes grossly normal to inspection.  No discharge or erythema, or obvious scleral/conjunctival abnormalities.  RESP: No audible wheeze, cough, or visible cyanosis.  No visible retractions or increased work of breathing.    SKIN: Visible skin clear. No significant rash, abnormal pigmentation or lesions.  NEURO: Cranial nerves grossly intact.  Mentation and speech appropriate for age.  PSYCH: Mentation appears normal, affect normal/bright, judgement and insight intact, normal speech and appearance well-groomed.                Video-Visit Details    Type of service:  Converted to phone visit due to technical difficulties.    Phone call duration: 5 min

## 2022-03-04 NOTE — PATIENT INSTRUCTIONS
-You can try allergy medication such as Claritin or Allegra in addition to benadryl  -Warm compresses may help unblock the tear duct and continue cold packs for the swelling  -If your symptoms worsen before Monday, you can be evaluated in urgent care.

## 2022-06-05 ENCOUNTER — HEALTH MAINTENANCE LETTER (OUTPATIENT)
Age: 62
End: 2022-06-05

## 2022-10-16 ENCOUNTER — HEALTH MAINTENANCE LETTER (OUTPATIENT)
Age: 62
End: 2022-10-16

## 2022-12-09 PROBLEM — E66.01 MORBID OBESITY (H): Status: RESOLVED | Noted: 2019-03-12 | Resolved: 2022-12-09

## 2023-03-06 ENCOUNTER — HOSPITAL ENCOUNTER (EMERGENCY)
Facility: CLINIC | Age: 63
Discharge: HOME | End: 2023-03-06
Attending: PHYSICIAN ASSISTANT | Admitting: PHYSICIAN ASSISTANT
Payer: OTHER GOVERNMENT

## 2023-03-06 ENCOUNTER — APPOINTMENT (OUTPATIENT)
Dept: CT IMAGING | Facility: CLINIC | Age: 63
End: 2023-03-06
Attending: PHYSICIAN ASSISTANT
Payer: OTHER GOVERNMENT

## 2023-03-06 VITALS
OXYGEN SATURATION: 98 % | SYSTOLIC BLOOD PRESSURE: 108 MMHG | RESPIRATION RATE: 20 BRPM | DIASTOLIC BLOOD PRESSURE: 69 MMHG | TEMPERATURE: 98.2 F | HEART RATE: 90 BPM

## 2023-03-06 DIAGNOSIS — J02.0 STREPTOCOCCAL PHARYNGITIS: ICD-10-CM

## 2023-03-06 LAB
ANION GAP SERPL CALCULATED.3IONS-SCNC: 17 MMOL/L (ref 7–15)
BASOPHILS # BLD MANUAL: 0 10E3/UL (ref 0–0.2)
BASOPHILS NFR BLD MANUAL: 0 %
BUN SERPL-MCNC: 15.1 MG/DL (ref 8–23)
CALCIUM SERPL-MCNC: 9.8 MG/DL (ref 8.8–10.2)
CHLORIDE SERPL-SCNC: 99 MMOL/L (ref 98–107)
CREAT SERPL-MCNC: 0.89 MG/DL (ref 0.51–0.95)
DEPRECATED HCO3 PLAS-SCNC: 23 MMOL/L (ref 22–29)
DEPRECATED S PYO AG THROAT QL EIA: POSITIVE
EOSINOPHIL # BLD MANUAL: 0 10E3/UL (ref 0–0.7)
EOSINOPHIL NFR BLD MANUAL: 0 %
ERYTHROCYTE [DISTWIDTH] IN BLOOD BY AUTOMATED COUNT: 14.8 % (ref 10–15)
FLUAV RNA SPEC QL NAA+PROBE: NEGATIVE
FLUBV RNA RESP QL NAA+PROBE: NEGATIVE
GFR SERPL CREATININE-BSD FRML MDRD: 74 ML/MIN/1.73M2
GLUCOSE SERPL-MCNC: 126 MG/DL (ref 70–99)
HCT VFR BLD AUTO: 40 % (ref 35–47)
HGB BLD-MCNC: 13 G/DL (ref 11.7–15.7)
LYMPHOCYTES # BLD MANUAL: 3.7 10E3/UL (ref 0.8–5.3)
LYMPHOCYTES NFR BLD MANUAL: 18 %
MCH RBC QN AUTO: 26.3 PG (ref 26.5–33)
MCHC RBC AUTO-ENTMCNC: 32.5 G/DL (ref 31.5–36.5)
MCV RBC AUTO: 81 FL (ref 78–100)
MONOCYTES # BLD MANUAL: 1.5 10E3/UL (ref 0–1.3)
MONOCYTES NFR BLD MANUAL: 7 %
NEUTROPHILS # BLD MANUAL: 15.6 10E3/UL (ref 1.6–8.3)
NEUTROPHILS NFR BLD MANUAL: 75 %
PLAT MORPH BLD: ABNORMAL
PLATELET # BLD AUTO: 182 10E3/UL (ref 150–450)
POTASSIUM SERPL-SCNC: 3.4 MMOL/L (ref 3.4–5.3)
RBC # BLD AUTO: 4.95 10E6/UL (ref 3.8–5.2)
RBC MORPH BLD: ABNORMAL
RSV RNA SPEC NAA+PROBE: NEGATIVE
SARS-COV-2 RNA RESP QL NAA+PROBE: NEGATIVE
SODIUM SERPL-SCNC: 139 MMOL/L (ref 136–145)
WBC # BLD AUTO: 20.8 10E3/UL (ref 4–11)

## 2023-03-06 PROCEDURE — 99285 EMERGENCY DEPT VISIT HI MDM: CPT | Mod: CS,25

## 2023-03-06 PROCEDURE — 87880 STREP A ASSAY W/OPTIC: CPT | Performed by: PHYSICIAN ASSISTANT

## 2023-03-06 PROCEDURE — 70491 CT SOFT TISSUE NECK W/DYE: CPT

## 2023-03-06 PROCEDURE — 250N000012 HC RX MED GY IP 250 OP 636 PS 637: Performed by: PHYSICIAN ASSISTANT

## 2023-03-06 PROCEDURE — 85027 COMPLETE CBC AUTOMATED: CPT | Performed by: PHYSICIAN ASSISTANT

## 2023-03-06 PROCEDURE — 80048 BASIC METABOLIC PNL TOTAL CA: CPT | Performed by: PHYSICIAN ASSISTANT

## 2023-03-06 PROCEDURE — 87637 SARSCOV2&INF A&B&RSV AMP PRB: CPT | Performed by: PHYSICIAN ASSISTANT

## 2023-03-06 PROCEDURE — 258N000003 HC RX IP 258 OP 636: Performed by: PHYSICIAN ASSISTANT

## 2023-03-06 PROCEDURE — 96365 THER/PROPH/DIAG IV INF INIT: CPT | Mod: 59

## 2023-03-06 PROCEDURE — 36415 COLL VENOUS BLD VENIPUNCTURE: CPT | Performed by: PHYSICIAN ASSISTANT

## 2023-03-06 PROCEDURE — C9803 HOPD COVID-19 SPEC COLLECT: HCPCS

## 2023-03-06 PROCEDURE — 250N000013 HC RX MED GY IP 250 OP 250 PS 637: Performed by: EMERGENCY MEDICINE

## 2023-03-06 PROCEDURE — 250N000011 HC RX IP 250 OP 636: Performed by: PHYSICIAN ASSISTANT

## 2023-03-06 PROCEDURE — 250N000013 HC RX MED GY IP 250 OP 250 PS 637: Performed by: PHYSICIAN ASSISTANT

## 2023-03-06 PROCEDURE — 96361 HYDRATE IV INFUSION ADD-ON: CPT

## 2023-03-06 PROCEDURE — 250N000009 HC RX 250: Performed by: PHYSICIAN ASSISTANT

## 2023-03-06 PROCEDURE — 85007 BL SMEAR W/DIFF WBC COUNT: CPT | Performed by: PHYSICIAN ASSISTANT

## 2023-03-06 RX ORDER — ONDANSETRON 4 MG/1
4 TABLET, ORALLY DISINTEGRATING ORAL ONCE
Status: COMPLETED | OUTPATIENT
Start: 2023-03-06 | End: 2023-03-06

## 2023-03-06 RX ORDER — AMPICILLIN AND SULBACTAM 2; 1 G/1; G/1
3 INJECTION, POWDER, FOR SOLUTION INTRAMUSCULAR; INTRAVENOUS ONCE
Status: COMPLETED | OUTPATIENT
Start: 2023-03-06 | End: 2023-03-06

## 2023-03-06 RX ORDER — IBUPROFEN 600 MG/1
600 TABLET, FILM COATED ORAL ONCE
Status: COMPLETED | OUTPATIENT
Start: 2023-03-06 | End: 2023-03-06

## 2023-03-06 RX ORDER — DIPHENHYDRAMINE HCL 25 MG
25 CAPSULE ORAL ONCE
Status: COMPLETED | OUTPATIENT
Start: 2023-03-06 | End: 2023-03-06

## 2023-03-06 RX ORDER — SODIUM CHLORIDE 9 MG/ML
INJECTION, SOLUTION INTRAVENOUS CONTINUOUS
Status: DISCONTINUED | OUTPATIENT
Start: 2023-03-06 | End: 2023-03-06 | Stop reason: HOSPADM

## 2023-03-06 RX ORDER — IOPAMIDOL 755 MG/ML
100 INJECTION, SOLUTION INTRAVASCULAR ONCE
Status: COMPLETED | OUTPATIENT
Start: 2023-03-06 | End: 2023-03-06

## 2023-03-06 RX ADMIN — IOPAMIDOL 100 ML: 755 INJECTION, SOLUTION INTRAVENOUS at 13:07

## 2023-03-06 RX ADMIN — DIPHENHYDRAMINE HYDROCHLORIDE 25 MG: 25 CAPSULE ORAL at 14:10

## 2023-03-06 RX ADMIN — SODIUM CHLORIDE 1000 ML: 9 INJECTION, SOLUTION INTRAVENOUS at 12:08

## 2023-03-06 RX ADMIN — ORAL VEHICLES - SUSP 10 MG: SUSPENSION at 14:30

## 2023-03-06 RX ADMIN — AMPICILLIN SODIUM AND SULBACTAM SODIUM 3 G: 2; 1 INJECTION, POWDER, FOR SOLUTION INTRAMUSCULAR; INTRAVENOUS at 14:08

## 2023-03-06 RX ADMIN — SODIUM CHLORIDE 60 ML: 900 INJECTION INTRAVENOUS at 13:07

## 2023-03-06 RX ADMIN — IBUPROFEN 600 MG: 600 TABLET ORAL at 10:47

## 2023-03-06 RX ADMIN — ONDANSETRON 4 MG: 4 TABLET, ORALLY DISINTEGRATING ORAL at 12:07

## 2023-03-06 ASSESSMENT — ENCOUNTER SYMPTOMS
VOMITING: 1
COUGH: 0
NAUSEA: 1
SORE THROAT: 1
WEAKNESS: 1
FEVER: 1

## 2023-03-06 ASSESSMENT — ACTIVITIES OF DAILY LIVING (ADL): ADLS_ACUITY_SCORE: 35

## 2023-03-06 NOTE — ED PROVIDER NOTES
"    History     Chief Complaint:  Pharyngitis     HPI   Maida Jarrett is a 62 year old female presents with a sore throat. The patient reports onset of sore throat, fever, generalized weakness, vomiting and nausea around 3 days ago. She has pain to both sides of her throat. She notes her heart rate was elevated. She has been having some pain with swallowing an deports trouble with taking PO due to throat pain (\"it keeps coming back up\").  She denies having a cough. No chest pain or shortness of breath. No overt fevers. She denies history of diabetes or asthma. No history of blood thinner use. She is not currently pregnant or breast feeding. She is not a detailed historian.     Independent Historian: Yes     Review of External Notes: 3/4/22 virtual visit with PCP for periorbital swelling that was suspected to be an allergic reaction.      ROS:  Review of Systems   Constitutional: Positive for fever.   HENT: Positive for sore throat.    Respiratory: Negative for cough.    Gastrointestinal: Positive for nausea and vomiting.   Neurological: Positive for weakness.   All other systems reviewed and are negative.      Allergies:  No Known Allergies     Medications:    The patient is currently on no regular medications.    Past Medical History:    Hypertension   Recurrent URI  Obesity      Past Surgical History:    Tubal ligation    Family History:    Mother: cancer, type II diabetes, hypertension  Father: liver cancer     Social History:  She presents to the ED with her sister.   PCP: Lindsey Quintanilla     Physical Exam     Patient Vitals for the past 24 hrs:   BP Temp Temp src Pulse Resp SpO2   03/06/23 1320 108/69 98.2  F (36.8  C) Oral 90 20 98 %   03/06/23 1031 (!) 144/68 98.5  F (36.9  C) Oral 107 18 97 %        Physical Exam  Vitals and nursing note reviewed.   Constitutional:       General: She is not in acute distress.     Appearance: Normal appearance. She is obese. She is not ill-appearing, toxic-appearing " or diaphoretic.   HENT:      Right Ear: Tympanic membrane, ear canal and external ear normal.      Left Ear: Tympanic membrane, ear canal and external ear normal.      Mouth/Throat:      Mouth: Mucous membranes are moist.      Pharynx: Oropharynx is clear. Posterior oropharyngeal erythema present.      Comments: Tonsils 1+ bilat, symmetric.  Voice slightly muffled.  Uvula midline.  No trismus.  No stridor. Tolerating secretions but gagging intermittently.   Eyes:      General:         Right eye: No discharge.         Left eye: No discharge.      Conjunctiva/sclera: Conjunctivae normal.   Cardiovascular:      Rate and Rhythm: Regular rhythm. Tachycardia present.      Pulses: Normal pulses.      Heart sounds: Normal heart sounds.   Pulmonary:      Effort: Pulmonary effort is normal. No respiratory distress.      Breath sounds: Normal breath sounds.   Abdominal:      Palpations: Abdomen is soft.   Musculoskeletal:         General: Normal range of motion.      Cervical back: Normal range of motion and neck supple. No rigidity.   Skin:     General: Skin is warm.      Capillary Refill: Capillary refill takes less than 2 seconds.   Neurological:      General: No focal deficit present.      Mental Status: She is alert.   Psychiatric:         Mood and Affect: Mood normal.         Behavior: Behavior normal.       Emergency Department Course   Imaging:  Soft tissue neck CT w contrast   Preliminary Result   IMPRESSION:     1. Enlarged heterogeneously enhancing tonsillar tissue, consistent   with tonsillitis.   2. Small areas of hypoattenuation within the palatine tonsils as   detailed which could represent inflammatory fluid within tonsillar   crypts or small/early developing abscesses/phlegmon, likely not   drainable. Recommend continued follow-up.   3. Bulky bilateral lymphadenopathy, presumably reactive, however,   myeloproliferative process cannot be excluded. Recommend continued   follow-up to ensure resolution.         Read per Radiology    Laboratory:  Labs Ordered and Resulted from Time of ED Arrival to Time of ED Departure   BASIC METABOLIC PANEL - Abnormal       Result Value    Sodium 139      Potassium 3.4      Chloride 99      Carbon Dioxide (CO2) 23      Anion Gap 17 (*)     Urea Nitrogen 15.1      Creatinine 0.89      Calcium 9.8      Glucose 126 (*)     GFR Estimate 74     CBC WITH PLATELETS AND DIFFERENTIAL - Abnormal    WBC Count 20.8 (*)     RBC Count 4.95      Hemoglobin 13.0      Hematocrit 40.0      MCV 81      MCH 26.3 (*)     MCHC 32.5      RDW 14.8      Platelet Count 182     DIFFERENTIAL - Abnormal    % Neutrophils 75      % Lymphocytes 18      % Monocytes 7      % Eosinophils 0      % Basophils 0      Absolute Neutrophils 15.6 (*)     Absolute Lymphocytes 3.7      Absolute Monocytes 1.5 (*)     Absolute Eosinophils 0.0      Absolute Basophils 0.0      RBC Morphology Confirmed RBC Indices      Platelet Assessment        Value: Automated Count Confirmed. Platelet morphology is normal.   STREPTOCOCCUS A RAPID SCREEN W REFELX TO PCR - Abnormal    Group A Strep antigen Positive (*)    INFLUENZA A/B, RSV, & SARS-COV2 PCR - Normal    Influenza A PCR Negative      Influenza B PCR Negative      RSV PCR Negative      SARS CoV2 PCR Negative            Emergency Department Course & Assessments:             Interventions:  Medications   0.9% sodium chloride BOLUS (has no administration in time range)     Followed by   sodium chloride 0.9% infusion (has no administration in time range)   ampicillin-sulbactam (UNASYN) 3 g vial to attach to  mL bag (has no administration in time range)   dexamethasone (DECADRON) alcohol-free oral solution 10 mg (has no administration in time range)   ibuprofen (ADVIL/MOTRIN) tablet 600 mg (600 mg Oral $Given 3/6/23 1047)   ondansetron (ZOFRAN ODT) ODT tab 4 mg (4 mg Oral $Given 3/6/23 1207)   Saline flush (60 mLs Intravenous $Given 3/6/23 1307)   iopamidol (ISOVUE-370) solution 100  mL (100 mLs Intravenous $Given 3/6/23 1301)        Independent Interpretation (X-rays, CTs, rhythm strip):  None      Consultations/Discussion of Management or Tests:   ED Course as of 23 1402   Mon Mar 06, 2023   1129 Rapid Strep A Screen(!): Positive   1200 Agreed on plan for CT scan with patient    1255 Labs noted thus far.    1256 CT with patient   1320 Rechecked the patient, lying back on bed, talking on phone, no distress   1353 Recheck   1401 Rechecked and updated on plan        Social Determinants of Health affecting care:  none     Assessments:  1146 I examined the patient and obtained history.     Disposition:  The patient was discharged to home.     Impression & Plan    Medical Decision Makin y/o female presents with ST, subjective fever, myalgia with gagging for past 3-4 days.      Her strep is pos and she does have pharyngitis on exam.  No overt signs of PTA on exam but her voice is slightly muffed and she is gagging in the room although overall tolerating her secretions and no stridor or increased work of breathing on exam.     This may just strep pharyngitis but with her age and constellation of symptoms/exam will check CT soft tissue neck as have concern for possible deeper space infection (PTA/RPA, felt less likely epiglottitis).  CBC for leukocytosis/penia, anemia, and abnl platelets.  BMP to assess electrolytes and renal function.  COVID/Flu considered but felt less likely with strep positive and pharyngitis on exam.  This is pending. No concern for pneumonia at this time.  No OM/OE on exam nor concern for meningitis. She is tachycardic which I suspect is related to infection/subjective fevers and likely volume down with poor PO intake over the weekend.  She and sister at BS are happy with plan.     Update: Pt lying back on bed with ease, has been talking on phone. Able to take PO with juice crackers. Sister remains at BS. Discussed CT reassuring against drainable abscess at this time.  Possible early and will need to monitor her symptoms. She does have leukocytosis and noted LAD on CT imaging, both of which would be expected with strep pharyngitis.  That said, discussed this should be rechecked with her established PCP and ideal to be rechecked before the end of this week due to her pharyngitis.  She was treated with dex and unasyn here and I will discharge her with course of Augmentin due to concern for possible early/developing PTA per imaging.  She and sister at  are happy with plan.  Of note, on re-eval her only new complaint is she feels her right thumbs look red.  No apparent erythema or swelling and she denies worsening throat pain, dyspnea or pruritic sensation, this is felt less likely worrisome allergic reaction but provided dose of benadryl here for consideration (has never had CT contrast dye).       Diagnosis:    ICD-10-CM    1. Streptococcal pharyngitis  J02.0          Discharge Medications:  New Prescriptions    AMOXICILLIN-CLAVULANATE (AUGMENTIN) 875-125 MG TABLET    Take 1 tablet by mouth 2 times daily for 10 days      Scribe Disclosure:  I, Rusty Buchanan, am serving as a scribe at 11:24 AM on 3/6/2023 to document services personally performed by Alana Cabral PA-C based on my observations and the provider's statements to me.    3/6/2023   Alana Cabral PA-C Medure, Leah M, PA-C  03/06/23 1825

## 2023-03-06 NOTE — DISCHARGE INSTRUCTIONS
-No drainable abscess on your CT scan today.  Possible your body is working to develop an abscess so be sure to monitor your symptoms.    -Push fluids (important you stay hydrated)  -Motrin/tylenol as needed for throat pain  -Return to the ER if you develop worsening throat pain (especially if on one side), unable to swallow saliva, trouble breathing or swallowing or trouble opening your mouth due to throat or neck swelling.  Unable to keep in oral fluid or the medication due to vomiting or you have any other concerns.   -Suspected your white blood cell count and lymph nodes are elevated and swollen due to immune response to your strep pharyngitis illness.  However, as discussed important this is rechecked with your primary  care provider, as if either does not resolve with resolution of your illness, you will require further evaluation.

## 2023-03-16 ENCOUNTER — LAB (OUTPATIENT)
Dept: FAMILY MEDICINE | Facility: CLINIC | Age: 63
End: 2023-03-16

## 2023-03-16 ENCOUNTER — OFFICE VISIT (OUTPATIENT)
Dept: FAMILY MEDICINE | Facility: CLINIC | Age: 63
End: 2023-03-16
Payer: OTHER GOVERNMENT

## 2023-03-16 VITALS
DIASTOLIC BLOOD PRESSURE: 88 MMHG | BODY MASS INDEX: 48.82 KG/M2 | HEART RATE: 103 BPM | TEMPERATURE: 99 F | WEIGHT: 293 LBS | HEIGHT: 65 IN | RESPIRATION RATE: 17 BRPM | SYSTOLIC BLOOD PRESSURE: 138 MMHG | OXYGEN SATURATION: 98 %

## 2023-03-16 DIAGNOSIS — R73.01 IMPAIRED FASTING GLUCOSE: ICD-10-CM

## 2023-03-16 DIAGNOSIS — Z00.00 ROUTINE GENERAL MEDICAL EXAMINATION AT A HEALTH CARE FACILITY: Primary | ICD-10-CM

## 2023-03-16 DIAGNOSIS — Z12.11 SCREEN FOR COLON CANCER: ICD-10-CM

## 2023-03-16 DIAGNOSIS — E55.9 VITAMIN D DEFICIENCY: ICD-10-CM

## 2023-03-16 DIAGNOSIS — R71.8 MICROCYTOSIS: ICD-10-CM

## 2023-03-16 DIAGNOSIS — R63.5 ABNORMAL WEIGHT GAIN: ICD-10-CM

## 2023-03-16 DIAGNOSIS — N95.0 POST-MENOPAUSAL BLEEDING: ICD-10-CM

## 2023-03-16 DIAGNOSIS — R03.0 ELEVATED BLOOD PRESSURE READING WITHOUT DIAGNOSIS OF HYPERTENSION: ICD-10-CM

## 2023-03-16 LAB
BASOPHILS # BLD AUTO: 0 10E3/UL (ref 0–0.2)
BASOPHILS NFR BLD AUTO: 0 %
EOSINOPHIL # BLD AUTO: 0.1 10E3/UL (ref 0–0.7)
EOSINOPHIL NFR BLD AUTO: 1 %
ERYTHROCYTE [DISTWIDTH] IN BLOOD BY AUTOMATED COUNT: 15.9 % (ref 10–15)
FOLATE SERPL-MCNC: 5.7 NG/ML (ref 4.6–34.8)
HBA1C MFR BLD: 6.1 % (ref 0–5.6)
HCT VFR BLD AUTO: 39.1 % (ref 35–47)
HGB BLD-MCNC: 12.2 G/DL (ref 11.7–15.7)
IMM GRANULOCYTES # BLD: 0 10E3/UL
IMM GRANULOCYTES NFR BLD: 0 %
LYMPHOCYTES # BLD AUTO: 4.2 10E3/UL (ref 0.8–5.3)
LYMPHOCYTES NFR BLD AUTO: 40 %
MCH RBC QN AUTO: 26 PG (ref 26.5–33)
MCHC RBC AUTO-ENTMCNC: 31.2 G/DL (ref 31.5–36.5)
MCV RBC AUTO: 83 FL (ref 78–100)
MONOCYTES # BLD AUTO: 0.6 10E3/UL (ref 0–1.3)
MONOCYTES NFR BLD AUTO: 6 %
NEUTROPHILS # BLD AUTO: 5.4 10E3/UL (ref 1.6–8.3)
NEUTROPHILS NFR BLD AUTO: 52 %
PLATELET # BLD AUTO: 253 10E3/UL (ref 150–450)
RBC # BLD AUTO: 4.7 10E6/UL (ref 3.8–5.2)
TSH SERPL DL<=0.005 MIU/L-ACNC: 2.39 UIU/ML (ref 0.3–4.2)
VIT B12 SERPL-MCNC: 1109 PG/ML (ref 232–1245)
WBC # BLD AUTO: 10.4 10E3/UL (ref 4–11)

## 2023-03-16 PROCEDURE — 84443 ASSAY THYROID STIM HORMONE: CPT | Performed by: FAMILY MEDICINE

## 2023-03-16 PROCEDURE — 82306 VITAMIN D 25 HYDROXY: CPT | Performed by: FAMILY MEDICINE

## 2023-03-16 PROCEDURE — 83036 HEMOGLOBIN GLYCOSYLATED A1C: CPT | Performed by: FAMILY MEDICINE

## 2023-03-16 PROCEDURE — 99214 OFFICE O/P EST MOD 30 MIN: CPT | Mod: 25 | Performed by: FAMILY MEDICINE

## 2023-03-16 PROCEDURE — 85025 COMPLETE CBC W/AUTO DIFF WBC: CPT | Performed by: FAMILY MEDICINE

## 2023-03-16 PROCEDURE — 82607 VITAMIN B-12: CPT | Performed by: FAMILY MEDICINE

## 2023-03-16 PROCEDURE — 82746 ASSAY OF FOLIC ACID SERUM: CPT | Performed by: FAMILY MEDICINE

## 2023-03-16 PROCEDURE — 99396 PREV VISIT EST AGE 40-64: CPT | Performed by: FAMILY MEDICINE

## 2023-03-16 PROCEDURE — 36415 COLL VENOUS BLD VENIPUNCTURE: CPT | Performed by: FAMILY MEDICINE

## 2023-03-16 PROCEDURE — 82728 ASSAY OF FERRITIN: CPT | Performed by: FAMILY MEDICINE

## 2023-03-16 ASSESSMENT — ENCOUNTER SYMPTOMS
SHORTNESS OF BREATH: 0
DYSURIA: 0
NERVOUS/ANXIOUS: 1
ARTHRALGIAS: 0
HEMATURIA: 0
NAUSEA: 0
PALPITATIONS: 0
HEARTBURN: 0
WEAKNESS: 0
PARESTHESIAS: 0
ABDOMINAL PAIN: 0
MYALGIAS: 0
HEADACHES: 0
COUGH: 0
EYE PAIN: 0
SORE THROAT: 0
CHILLS: 0
CONSTIPATION: 0
FREQUENCY: 0
FEVER: 0
DIZZINESS: 0
JOINT SWELLING: 0
HEMATOCHEZIA: 0
DIARRHEA: 0

## 2023-03-16 NOTE — PROGRESS NOTES
SUBJECTIVE:   CC: Marilou is an 62 year old who presents for preventive health visit.     She notes recent ER visit, diagnosed with strep, she's worried that her CBC was abnormal.    Recent Labs   Lab Test 08/03/21  1630 01/31/20  0833   CHOL 207* 176   HDL 81 73   * 91   TRIG 98 61   Impaired fasting glucose Follow-up      Patient is checking blood sugars: not at all    Diabetic concerns: None     Symptoms of hypoglycemia (low blood sugar): none     Paresthesias (numbness or burning in feet) or sores: No      Lab Results   Component Value Date    A1C 6.1 03/16/2023    A1C 5.5 08/03/2021    A1C 5.4 05/10/2019           Elevated blood pressure Follow-up      Do you check your blood pressure regularly outside of the clinic? Yes     Are you following a low salt diet? No    Are your blood pressures ever more than 140 on the top number (systolic) OR more   than 90 on the bottom number (diastolic), for example 140/90? No       Vaginal spotting  Marilou reports intermittent spotting recently, has been in menopause for over 5 years. She has had prior spotting  2/3/2020 with normal work up including US. Denies pain, bloating or other symptoms.      Patient has been advised of split billing requirements and indicates understanding: Yes  Healthy Habits:     Getting at least 3 servings of Calcium per day:  Yes    Bi-annual eye exam:  NO    Dental care twice a year:  NO    Sleep apnea or symptoms of sleep apnea:  None    Diet:  Regular (no restrictions)    Frequency of exercise:  2-3 days/week    Duration of exercise:  15-30 minutes    Taking medications regularly:  Yes    Medication side effects:  Not applicable    PHQ-2 Total Score: 0    Additional concerns today:  No      Today's PHQ-2 Score:   PHQ-2 ( 1999 [employer]) 3/16/2023   Q1: Little interest or pleasure in doing things 0   Q2: Feeling down, depressed or hopeless 0   PHQ-2 Score 0   PHQ-2 Total Score (12-17 Years)- Positive if 3 or more points; Administer PHQ-A if  positive -   Q1: Little interest or pleasure in doing things Not at all   Q2: Feeling down, depressed or hopeless Not at all   PHQ-2 Score 0       Social History     Tobacco Use     Smoking status: Never     Smokeless tobacco: Never   Substance Use Topics     Alcohol use: No         Alcohol Use 3/16/2023   Prescreen: >3 drinks/day or >7 drinks/week? Not Applicable       Reviewed orders with patient.  Reviewed health maintenance and updated orders accordingly - Yes  BP Readings from Last 3 Encounters:   03/16/23 138/88   03/06/23 108/69   08/03/21 (!) 144/78    Wt Readings from Last 3 Encounters:   03/16/23 134.6 kg (296 lb 11.2 oz)   08/03/21 122.5 kg (270 lb)   06/09/20 129.3 kg (285 lb)                  Patient Active Problem List   Diagnosis     Elevated blood pressure reading without diagnosis of hypertension     Elevated AST (SGOT)     Post-menopausal bleeding     Obesity, Class III, BMI 40-49.9 (morbid obesity) (H)     Vitamin D deficiency     Recurrent URI (upper respiratory infection)     Past Surgical History:   Procedure Laterality Date     TUBAL LIGATION  1987       Social History     Tobacco Use     Smoking status: Never     Smokeless tobacco: Never   Substance Use Topics     Alcohol use: No     Family History   Problem Relation Age of Onset     Cancer Mother 80        liver/bile duct cancer     Hypertension Mother      Diabetes Type 2  Mother      Liver Cancer Father 40         Current Outpatient Medications   Medication Sig Dispense Refill     amoxicillin-clavulanate (AUGMENTIN) 875-125 MG tablet Take 1 tablet by mouth 2 times daily for 10 days 20 tablet 0     Cyanocobalamin (B-12 PO)        Multiple Vitamins-Minerals (VITAMIN D3 COMPLETE PO)        No Known Allergies    Breast Cancer Screening:    Breast CA Risk Assessment (FHS-7) 7/16/2021 7/16/2021   Do you have a family history of breast, colon, or ovarian cancer? No / Unknown No / Unknown         Mammogram Screening: Recommended mammography every  1-2 years with patient discussion and risk factor consideration  Pertinent mammograms are reviewed under the imaging tab.    History of abnormal Pap smear: NO - age 30-65 PAP every 5 years with negative HPV co-testing recommended  PAP / HPV Latest Ref Rng & Units 5/10/2019   PAP (Historical) - NIL   HPV16 NEG:Negative Negative   HPV18 NEG:Negative Negative   HRHPV NEG:Negative Negative     Reviewed and updated as needed this visit by clinical staff   Tobacco  Allergies  Meds  Problems  Med Hx  Surg Hx           Reviewed and updated as needed this visit by Provider   Tobacco   Meds  Problems  Med Hx  Surg Hx          Past Medical History:   Diagnosis Date     Post-menopausal bleeding       Past Surgical History:   Procedure Laterality Date     TUBAL LIGATION       OB History    Para Term  AB Living   2 2 2 0 0 2   SAB IAB Ectopic Multiple Live Births   0 0 0 0 2      # Outcome Date GA Lbr Pravin/2nd Weight Sex Delivery Anes PTL Lv   2 Term 10/28/87 40w0d   M   N YULISA      Name: Chance Ritter Term 82 40w0d   F   N YULISA      Name: Luis       Review of Systems   Constitutional: Negative for chills and fever.   HENT: Negative for congestion, ear pain, hearing loss and sore throat.    Eyes: Negative for pain and visual disturbance.   Respiratory: Negative for cough and shortness of breath.    Cardiovascular: Negative for chest pain, palpitations and peripheral edema.   Gastrointestinal: Negative for abdominal pain, constipation, diarrhea, heartburn, hematochezia and nausea.   Genitourinary: Negative for dysuria, frequency, genital sores, hematuria and urgency.   Musculoskeletal: Negative for arthralgias, joint swelling and myalgias.   Skin: Negative for rash.   Neurological: Negative for dizziness, weakness, headaches and paresthesias.   Psychiatric/Behavioral: Negative for mood changes. The patient is nervous/anxious.         OBJECTIVE:   /88 (BP Location: Right arm, Patient  "Position: Sitting, Cuff Size: Adult Large)   Pulse 103   Temp 99  F (37.2  C) (Temporal)   Resp 17   Ht 1.642 m (5' 4.65\")   Wt 134.6 kg (296 lb 11.2 oz)   LMP  (LMP Unknown)   SpO2 98%   BMI 49.92 kg/m     Wt Readings from Last 4 Encounters:   03/16/23 134.6 kg (296 lb 11.2 oz)   08/03/21 122.5 kg (270 lb)   06/09/20 129.3 kg (285 lb)   02/25/20 129.2 kg (284 lb 12.8 oz)     Physical Exam   She checks blood pressure at home, usually 120's/70's  GENERAL: healthy, alert and no distress  NECK: no adenopathy, no asymmetry, masses, or scars and thyroid normal to palpation  RESP: lungs clear to auscultation - no rales, rhonchi or wheezes  CV: regular rate and rhythm, normal S1 S2, no S3 or S4, no murmur, click or rub, no peripheral edema and peripheral pulses strong  ABDOMEN: soft, nontender, no hepatosplenomegaly, no masses and bowel sounds normal  MS: no gross musculoskeletal defects noted, no edema  SKIN: no suspicious lesions or rashes  NEURO: Normal strength and tone, mentation intact and speech normal  PSYCH: mentation appears normal, affect normal/bright    ASSESSMENT/PLAN:   (Z00.00) Routine general medical examination at a health care facility  (primary encounter diagnosis)  (Z12.11) Screen for colon cancer  Plan: COLMYRIAM([employer])    (R03.0) Elevated blood pressure reading without diagnosis of hypertension  Comment:   BP Readings from Last 3 Encounters:   03/16/23 138/88   03/06/23 108/69   08/03/21 (!) 144/78      Plan: borderline hypertension, recommended home monitoring and let me know, will treat as indicated.    (R73.01) Impaired fasting glucose  Comment: annual A1C screen  Plan: Hemoglobin A1c        Will fu as indicated.     (N95.0) Post-menopausal bleeding  Comment: New, previously undiagnosed problem with uncertain prognosis and additional work-up planned.   Will obtain labs and imaging as below refer to OB/Gyn for further evaluation and management  Plan: CBC with platelets and " differential, Vitamin         B12, Folate, US Pelvic Complete with         Transvaginal, Ob/Gyn Referral            (E55.9) Vitamin D deficiency  Comment: recheck  Plan: Vitamin D Deficiency        Will fu as indicated.     (R63.5) Abnormal weight gain  Comment: she is really struggling with weight despite trying to eat a healthy diet, would like thyroid checked  Plan: TSH with free T4 reflex            Patient has been advised of split billing requirements and indicates understanding: Yes      COUNSELING:  Reviewed preventive health counseling, as reflected in patient instructions        She reports that she has never smoked. She has never used smokeless tobacco.          Lindsey Quintanilla MD  [facility]

## 2023-03-16 NOTE — PATIENT INSTRUCTIONS
Ultrasound appointment:  Please call [facility] Radiology Department to schedule an outpatient appointment at [phone] for a pelvic ultrasound.    Shingles vaccine  I strongly recommend getting the shingles vaccine (Shingrix) if you are over age 50, but please check with your insurance to see how much you might have to pay for this vaccine! If you are on most insurance plans including Medicare, it's covered if you get it at a pharmacy but not in a clinic.    This shot will prevent shingles, a painful skin rash that you are at risk for getting if you have ever had chicken pox. Sometimes the pain will last the rest of your life, even after the rash has healed, and there is not much that we can do to relieve the pain. The vaccine is 98% effective at preventing shingles.    Please consider getting this vaccine! You'll need #2 vaccines 2-4 months apart to be protected.     Preventive Health Recommendations  Female Ages 50 - 64    Yearly exam: See your health care provider every year in order to  Review health changes.   Discuss preventive care.    Review your medicines if your doctor has prescribed any.    Get a Pap test every three years (unless you have an abnormal result and your provider advises testing more often).  If you get Pap tests with HPV test, you only need to test every 5 years, unless you have an abnormal result.   You do not need a Pap test if your uterus was removed (hysterectomy) and you have not had cancer.  You should be tested each year for STDs (sexually transmitted diseases) if you're at risk.   Have a mammogram every 1 to 2 years.  Have a colonoscopy at age 50, or have a yearly FIT test (stool test). These exams screen for colon cancer.    Have a cholesterol test every 5 years, or more often if advised.  Have a diabetes test (fasting glucose) every three years. If you are at risk for diabetes, you should have this test more often.   If you are at risk for osteoporosis (brittle bone disease),  think about having a bone density scan (DEXA).    Shots: Get a flu shot each year. Get a tetanus shot every 10 years.    Nutrition:   Eat at least 5 servings of fruits and vegetables each day.  Eat whole-grain bread, whole-wheat pasta and brown rice instead of white grains and rice.  Get adequate Calcium and Vitamin D.     Lifestyle  Exercise at least 150 minutes a week (30 minutes a day, 5 days a week). This will help you control your weight and prevent disease.  Limit alcohol to one drink per day.  No smoking.   Wear sunscreen to prevent skin cancer.   See your dentist every six months for an exam and cleaning.  See your eye doctor every 1 to 2 years.

## 2023-03-17 LAB
DEPRECATED CALCIDIOL+CALCIFEROL SERPL-MC: 45 UG/L (ref 20–75)
FERRITIN SERPL-MCNC: 230 NG/ML (ref 11–328)

## 2023-03-28 NOTE — RESULT ENCOUNTER NOTE
Thank you very much for getting labs done! Everything looks normal/stable which is good news.    Your thyroid, ferritin, folate levels, B12 and Vitamin D levels are all normal.   That being said, your red blood cells are just slightly smaller than normal, and taking extra iron might help you and give your more energy.       Your lab test to check for diabetes, HgA1C, also called glycosylated hemoglobin, which measures the level of sugar in your blood over the past few months, is slightly higher than normal but less than 6.5. This is good news, it means you don't have diabetes right now!  However, it does mean that you're at risk for developing diabetes in the future, so we'll keep checking this every year. Staying active and moderating carbohydrates in your diet is the best way to keep from developing diabetes.     If you have any questions, please contact the clinic or schedule an appointment with me, thank you!      Sincerely,  Dr. Lindsey Quintanilla MD  3/28/2023

## 2023-05-02 ENCOUNTER — OFFICE VISIT (OUTPATIENT)
Dept: OBGYN | Facility: CLINIC | Age: 63
End: 2023-05-02
Attending: FAMILY MEDICINE
Payer: OTHER GOVERNMENT

## 2023-05-02 ENCOUNTER — ANCILLARY PROCEDURE (OUTPATIENT)
Dept: ULTRASOUND IMAGING | Facility: CLINIC | Age: 63
End: 2023-05-02
Attending: FAMILY MEDICINE
Payer: OTHER GOVERNMENT

## 2023-05-02 VITALS — DIASTOLIC BLOOD PRESSURE: 84 MMHG | SYSTOLIC BLOOD PRESSURE: 132 MMHG | WEIGHT: 293 LBS | BODY MASS INDEX: 49.85 KG/M2

## 2023-05-02 DIAGNOSIS — R93.89 ENDOMETRIAL THICKENING ON ULTRASOUND: ICD-10-CM

## 2023-05-02 DIAGNOSIS — N95.0 POSTMENOPAUSAL BLEEDING: Primary | ICD-10-CM

## 2023-05-02 DIAGNOSIS — N95.0 POST-MENOPAUSAL BLEEDING: ICD-10-CM

## 2023-05-02 PROCEDURE — 76830 TRANSVAGINAL US NON-OB: CPT | Performed by: OBSTETRICS & GYNECOLOGY

## 2023-05-02 PROCEDURE — 76856 US EXAM PELVIC COMPLETE: CPT | Performed by: OBSTETRICS & GYNECOLOGY

## 2023-05-02 PROCEDURE — 99204 OFFICE O/P NEW MOD 45 MIN: CPT | Performed by: OBSTETRICS & GYNECOLOGY

## 2023-05-02 NOTE — PROGRESS NOTES
S; Maida Jarrett is a 62 year old   female referred by her PCP, Lindsey Quintanilla MD, due to postmenopausal bleeding.  Marilou reports that menopause was around age 52 and about 2-3 years ago had a few episodes of  bleeding, she had an us and EMB that showed endometrial atrophy.  She then didn't have bleeding until last , she has had several episodes of light spotting.  Typically lasting 1-3 days, but did have 11 days of spotting in 2022.  She saw Dr. Quintanilla in 3/23 for annual exam and reported the spotting.  A pelvic us was ordered and she was referred to me.  Aside from the spotting, last episode in 2023, she has no complaints.  She has known uterine fibroids    Previous visit note with Dr. Quintanilla, results of last 2 pelvic ultrasound and EMB from  reviewed on day of visit.    Past Medical History:   Diagnosis Date     Post-menopausal bleeding      Past Surgical History:   Procedure Laterality Date     TUBAL LIGATION       OB History    Para Term  AB Living   2 2 2 0 0 2   SAB IAB Ectopic Multiple Live Births   0 0 0 0 2      # Outcome Date GA Lbr Pravin/2nd Weight Sex Delivery Anes PTL Lv   2 Term 10/28/87 40w0d   M   N YULISA      Name: Chance   1 Term 84 40w0d   F   N YULISA      Name: Luis      No Known Allergies      Current Outpatient Medications:      Cyanocobalamin (B-12 PO), , Disp: , Rfl:      Multiple Vitamins-Minerals (VITAMIN D3 COMPLETE PO), , Disp: , Rfl:     Social History     Socioeconomic History     Marital status:      Spouse name: Not on file     Number of children: Not on file     Years of education: Not on file     Highest education level: Not on file   Occupational History     Not on file   Tobacco Use     Smoking status: Never     Smokeless tobacco: Never   Vaping Use     Vaping status: Not on file   Substance and Sexual Activity     Alcohol use: No     Drug use: No     Sexual activity: Yes     Partners: Male     Birth  control/protection: Female Surgical   Other Topics Concern     Not on file   Social History Narrative     Not on file     Social Determinants of Health     Financial Resource Strain: Not on file   Food Insecurity: Not on file   Transportation Needs: Not on file   Physical Activity: Not on file   Stress: Not on file   Social Connections: Not on file   Intimate Partner Violence: Not on file   Housing Stability: Not on file     Family History   Problem Relation Age of Onset     Cancer Mother 80        liver/bile duct cancer     Hypertension Mother      Diabetes Type 2  Mother      Liver Cancer Father 40     Past medical, surgical, social and family history were reviewed and updated in EPIC.    PE: /84   Wt 134.4 kg (296 lb 4.8 oz)   LMP  (LMP Unknown)   BMI 49.85 kg/m      Gen: obese female in NAD    Prelim us: images reviewed since no report, fibroids noted, EMS very difficult to see, measured at 8mm.      A/P;  bleeding with thickened endometrial stripe   We discussed her bleeding history as reviewed the common causes for postmenopausal bleeding, including risk of endometrial hyperplasia and cancer.  We reviewed her ultrasound from today and I explained I do not have report yet, but EMS is measured at 8mm, although very ill defined due to fibroids.  Given persistent  bleeding and thickened endometrium, EMB was recommended and she declined that today.  We also discussed option of scheduling hysteroscopy, D&C to look at endometrium in the event there is a polyp or something leading to persistent bleeding we can remove.  The procedure was described in detail.  She declines scheduling that procedure and would like to think about her options.  I offered to have our surgery scheduler call her in a few days to either schedule office EMB or hysteroscopy and she declined, wants to call us.  I again repeated my recommendation for endometrial sampling and gave her acog handouts and my card with clinic phone  number for scheduling.  Questions answered.    LEIGHTON LANDRUM MD

## 2023-05-02 NOTE — PATIENT INSTRUCTIONS
Endometrial Biopsy Post-Procedure Patient Instructions    Please call your healthcare provider if you have any of the following symptoms:  Fever--Temperature greater than 100 degrees  Cramping after 48 hours  Bleeding heavier than a normal period in the first 24-48 hours  If you are bleeding and soaking more than one pad an hour  Or any other worrisome problems.    We recommend that:  You use pads, not tampons for the bleeding.  You may resume sexual activity in 2-3 days, or after bleeding stops.  You may use Tylenol or ibuprofen (Motrin or Advil) for any discomfort.      [facility] - OB/GYN : [phone]

## 2023-05-19 DIAGNOSIS — N95.0 POST-MENOPAUSAL BLEEDING: Primary | ICD-10-CM

## 2023-05-19 DIAGNOSIS — D25.9 UTERINE LEIOMYOMA, UNSPECIFIED LOCATION: ICD-10-CM

## 2023-05-19 DIAGNOSIS — R93.89 ENDOMETRIAL THICKENING ON ULTRASOUND: ICD-10-CM

## 2023-05-19 NOTE — RESULT ENCOUNTER NOTE
Thank you for getting the ultrasound done!  You have multiple fibroids that are benign, in that they're not cancer, but they can most definitely cause symptoms including pain and bleeding.  The lining of the uterus, the endometrium, was slightly thickened, which can be due to swelling/inflammation, but should be investigated.    I recommend that you follow up with an Ob/Gyn specialist for evaluation and possible endometrial biopsy. I'll put that referral in for you, the Ob/Gyn specialists do come to [place]. [facility] [address] [place] [address]  Phone: [phone]  Fax: [phone]     Diagnoses: Post-menopausal bleeding  Uterine leiomyoma, unspecified location  Endometrial thickening on ultrasound  Order: Ob/Gyn Referral    Please call to schedule your appointment, thank you!    Sincerely,  Dr. Lindsey Quintanilla MD  5/19/2023

## 2023-06-23 NOTE — PROGRESS NOTES
SUBJECTIVE:   CC: Maida Jarrett is an 58 year old woman who presents for preventive health visit.     Physical   Annual:     Getting at least 3 servings of Calcium per day:  Yes    Bi-annual eye exam:  Yes    Dental care twice a year:  NO    Sleep apnea or symptoms of sleep apnea:  None    Diet:  Vegetarian/vegan    Frequency of exercise:  None    Duration of exercise:  N/A    Taking medications regularly:  No    Barriers to taking medications:  Not applicable    Additional concerns today:  No    PHQ-2 Total Score: 0    Glucose Intolerance   Follow-up      Patient is checking blood sugars: not at all    FBS = hi     Diabetic concerns: None     Symptoms of hypoglycemia (low blood sugar): none     Paresthesias (numbness or burning in feet) or sores: No     Date of last diabetic eye exam: ?    Hypertension- Uncontrolled       Outpatient blood pressures are being checked at home.  Results are 130 /70nbut pt cannot state how long ago this was --.    Here 160/100     Low Salt Diet: not monitoring salt           BP Readings from Last 2 Encounters:   03/12/19 (!) 140/100     No results found for: A1C, LDL          Today's PHQ-2 Score:   PHQ-2 ( 1999 Pfizer) 3/12/2019   Q1: Little interest or pleasure in doing things 0   Q2: Feeling down, depressed or hopeless 0   PHQ-2 Score 0       Abuse: Current or Past(Physical, Sexual or Emotional)- No  Do you feel safe in your environment? Yes    Social History     Tobacco Use     Smoking status: Never Smoker     Smokeless tobacco: Never Used   Substance Use Topics     Alcohol use: No     Frequency: Never     No flowsheet data found.No flowsheet data found.    Reviewed orders with patient.  Reviewed health maintenance and updated orders accordingly - Yes  Labs reviewed in [facility]    Mammogram Screening: Patient over age 50, mutual decision to screen reflected in health maintenance.    Pertinent mammograms are reviewed under the imaging tab.  History of abnormal Pap smear: NO - age  S/p TURBT and chemotherapy. Follows with Urologist, Dr. Bella.    "30- 65 PAP every 3 years recommended     Reviewed and updated as needed this visit by clinical staff  Tobacco  Allergies  Meds  Problems  Med Hx  Surg Hx  Fam Hx  Soc Hx        Reviewed and updated as needed this visit by Provider  Tobacco  Allergies  Meds  Problems  Med Hx  Surg Hx  Fam Hx            Review of Systems  CONSTITUTIONAL: NEGATIVE for fever, chills, change in weight  INTEGUMENTARY/SKIN: NEGATIVE for worrisome rashes, moles or lesions  EYES: NEGATIVE for vision changes or irritation  ENT: NEGATIVE for ear, mouth and throat problems  RESP: NEGATIVE for significant cough or SOB  BREAST: NEGATIVE for masses, tenderness or discharge  CV: NEGATIVE for chest pain, palpitations or peripheral edema  GI: NEGATIVE for nausea, abdominal pain, heartburn, or change in bowel habits  : NEGATIVE for unusual urinary or vaginal symptoms. No vaginal bleeding.  MUSCULOSKELETAL: NEGATIVE for significant arthralgias or myalgia  NEURO: NEGATIVE for weakness, dizziness or paresthesias  PSYCHIATRIC: NEGATIVE for changes in mood or affect      OBJECTIVE:   BP (!) 140/100   Pulse 104   Temp 98.1  F (36.7  C) (Tympanic)   Resp 20   Ht 1.626 m (5' 4\")   Wt 122.9 kg (271 lb)   LMP  (LMP Unknown)   SpO2 99%   Breastfeeding? No   BMI 46.52 kg/m    Physical Exam  GENERAL APPEARANCE: healthy, alert, no distress, obese and elderly  PSYCH: mentation appears normal, affect normal/bright, well groomed, judgement and insight intact, anxious, tangential, worried and speech pressured        ASSESSMENT/PLAN:       ICD-10-CM    1. Routine general medical examination at a health care facility Z00.00    2. Benign essential hypertension-uncontrolled  I10    3. Elevated AST (SGOT) R74.0    4. Impaired fasting glucose R73.01    5. Morbid obesity (H)  BMI 40-50 E66.01    6. Lipid screening Z13.220        COUNSELING:  Reviewed preventive health counseling, as reflected in patient instructions       Regular exercise       " "Healthy diet/nutrition    BP Readings from Last 1 Encounters:   03/12/19 (!) 140/100     Estimated body mass index is 46.52 kg/m  as calculated from the following:    Height as of this encounter: 1.626 m (5' 4\").    Weight as of this encounter: 122.9 kg (271 lb).    BP Screening:   Last 3 BP Readings:    BP Readings from Last 3 Encounters:   03/12/19 (!) 140/100       The following was recommended to the patient:  Referral to alternative/primary care provider per pt herself   Weight management plan: Discussed healthy diet and exercise guidelines     reports that  has never smoked. she has never used smokeless tobacco.     Patient Instructions     Preventive Health Recommendations  Female Ages 50 - 64    Yearly exam: See your health care provider every year in order to  o Review health changes.   o Discuss preventive care.    o Review your medicines if your doctor has prescribed any.      Get a Pap test every three years (unless you have an abnormal result and your provider advises testing more often).    If you get Pap tests with HPV test, you only need to test every 5 years, unless you have an abnormal result.     You do not need a Pap test if your uterus was removed (hysterectomy) and you have not had cancer.    You should be tested each year for STDs (sexually transmitted diseases) if you're at risk.     Have a mammogram every 1 to 2 years.    Have a colonoscopy at age 50, or have a yearly FIT test (stool test). These exams screen for colon cancer.      Have a cholesterol test every 5 years, or more often if advised.    Have a diabetes test (fasting glucose) every three years. If you are at risk for diabetes, you should have this test more often.     If you are at risk for osteoporosis (brittle bone disease), think about having a bone density scan (DEXA).    Shots: Get a flu shot each year. Get a tetanus shot every 10 years.    Nutrition:     Eat at least 5 servings of fruits and vegetables each day.    Eat " whole-grain bread, whole-wheat pasta and brown rice instead of white grains and rice.    Get adequate Calcium and Vitamin D.     Lifestyle    Exercise at least 150 minutes a week (30 minutes a day, 5 days a week). This will help you control your weight and prevent disease.    Limit alcohol to one drink per day.    No smoking.     Wear sunscreen to prevent skin cancer.     See your dentist every six months for an exam and cleaning.    See your eye doctor every 1 to 2 years.      1. Your blood pressure has been high or you are starting a new medication for it :   Please take 2-4-6 blood pressures and heart rates ocer a few days  and write them down with date, time of day and location and bring this record in in 3-5 weeks. The optimal blood  pressure is < 140/80 or close to this most of the time.    BP was 160/100    If still high, return     2.  Weight Loss Tips  1. Do not eat after 6 hrs before your expected bedtime  2. Have your heaviest meal for breakfast, a slightly lighter meal at lunch and a snack 6 hrs before bed  3. No sugar/calorie drinks except milk ie no fruit juice, pop, alcohol.  4. Drink milk 30min before meals to decrease your hunger. Also it is excellent as part of your last meal of the day snack  5. Drink lots of water  6. Increase fiber in diet: all bran cereal, salads, popcorn etc  7. Have only one small serving of fruit a day about 1/2 cup (as this is high in sugar)  8. EXERCISE is the bottom line. Without it, you will gain weight even on a low calorie diet. Best if done 2-3X a day as can    Being overweight contributes to high blood pressure and high cholesterol, both of which cause heart attacks, strokes and kidney failure, prediabetes and diabetes, arthritis, and liver disease       3. . Schedule your mammo at [facility]  At [address] at [phone]      4. Please do your breast exam every mo, when you  Change the  calendar page or set an alarm on your cell phone Do a  visual  "check for dimples, inversion or indentation or any different position of the nipple Feel manually  for any 1cm or larger  size mass ie about the size of an almond Be sure to cover the entire area of both breasts : this extends back to the back on either side and from the collar bone to the bottom of the breasts where you can begin to feel ribs.  Men are advised to do this exam also as they now have a higher rate of breast cancer , like women do .             Counseling Resources:  ATP IV Guidelines  Pooled Cohorts Equation Calculator  Breast Cancer Risk Calculator  FRAX Risk Assessment  ICSI Preventive Guidelines  Dietary Guidelines for Americans, 2010  [employer]'s MyPlate  ASA Prophylaxis  Lung CA Screening    Time spent with the patient 25mins, more than 50% in counseling and coordinating care, Re above medical problems.  Reviewed entire chart   On entering the room began to review the most pertinent with the pt :     HTN  -has been high --today initially 160/100   -pt keeps stating that her BP \" is not high\"   -that she has home BPS that are lower but poor hx on how recent   -tried toemphasize the risk of HTN and to have her bring in BP record     HIGH AST   - part of the rev of lab shows this   -could be from morbid obesity   - did ask re alcohol intake as not on PE intake   -again she staates it is \"OK\" and gives vague answers and does not want me to record   -finally states 2 drinks 2 x a wk     PT\"S LIST OF DESIRED LABS     -wants troponins, CEA 25 , and multiple others   - I was ordering them but    -warned pt that, as she has no Dx to qualify ordering these, they may be costly   -pt states she was \" just at another MD office\" and they \" ordered all she wanted \"     At this point she stated :\"I am not gettiing anything I want here \" and walked out jalen Worrell MD  [facility]  "

## 2023-11-07 ENCOUNTER — NURSE TRIAGE (OUTPATIENT)
Dept: FAMILY MEDICINE | Facility: CLINIC | Age: 63
End: 2023-11-07
Payer: OTHER GOVERNMENT

## 2023-11-07 NOTE — TELEPHONE ENCOUNTER
"Offered same day slot with provider for Wed and Thurs. Patient declines. Says she will call back tomorrow to schedule.      Reason for Disposition   Patient wants to be seen    Additional Information   Negative: Shock suspected (e.g., cold/pale/clammy skin, too weak to stand, low BP, rapid pulse)   Negative: Difficult to awaken or acting confused (e.g., disoriented, slurred speech)   Negative: Passed out (i.e., lost consciousness, collapsed and was not responding)   Negative: Sounds like a life-threatening emergency to the triager   Negative: Followed a genital area injury (e.g., vagina, vulva)   Negative: Vaginal discharge is main symptom and small amount of blood   Negative: SEVERE abdominal pain   Negative: SEVERE dizziness (e.g., unable to stand, requires support to walk, feels like passing out now)   Negative: Sexual assault or rape (sexual intercourse or activity occurs without freely given consent), known or suspected   Negative: SEVERE vaginal bleeding (e.g., soaking 2 pads or tampons per hour and present 2 or more hours; 1 menstrual cup every 2 hours)   Negative: Patient sounds very sick or weak to the triager   Negative: MODERATE vaginal bleeding (e.g., soaking 1 pad or tampon per hour and present > 6 hours; 1 menstrual cup every 6 hours)   Negative: Pale skin (pallor) of new-onset or worsening   Negative: Constant abdominal pain and present > 2 hours   Negative: Taking Coumadin (warfarin) or other strong blood thinner, or known bleeding disorder (e.g., thrombocytopenia)    Answer Assessment - Initial Assessment Questions  1. AMOUNT: \"Describe the bleeding that you are having.\" \"How much bleeding is there?\"     - SPOTTING: spotting, or pinkish / brownish mucous discharge; does not fill panty liner or pad     - MILD:  less than 1 pad / hour; less than patient's usual menstrual bleeding    - MODERATE: 1-2 pads / hour; 1 menstrual cup every 6 hours; small-medium blood clots (e.g., pea, grape, small coin)    - " "SEVERE: soaking 2 or more pads/hour for 2 or more hours; 1 menstrual cup every 2 hours; bleeding not contained by pads or continuous red blood from vagina; large blood clots (e.g., golf ball, large coin)       Intermittent spotting - moderate  2. ONSET: \"When did the bleeding begin?\" \"Is it continuing now?\"      Yes - started last month and lasts 4 days  3. MENOPAUSE: \"When was your last menstrual period?\"       Post menopausal  4. ABDOMEN PAIN: \"Do you have any pain?\" \"How bad is the pain?\"  (e.g., Scale 1-10; mild, moderate, or severe)    - MILD (1-3): doesn't interfere with normal activities, abdomen soft and not tender to touch     - MODERATE (4-7): interferes with normal activities or awakens from sleep, abdomen tender to touch     - SEVERE (8-10): excruciating pain, doubled over, unable to do any normal activities       cramping  5. BLOOD THINNERS: \"Do you take any blood thinners?\" (e.g., Coumadin/warfarin, Pradaxa/dabigatran, aspirin)      no  6. HORMONE MEDICINES: \"Are you taking any hormone medicines, prescription or OTC?\" (e.g., birth control pills, estrogen)      no  7. CAUSE: \"What do you think is causing the bleeding?\" (e.g., recent gyn surgery, recent gyn procedure; known bleeding disorder, uterine cancer)        unsure  8. HEMODYNAMIC STATUS: \"Are you weak or feeling lightheaded?\" If Yes, ask: \"Can you stand and walk normally?\"        no  9. OTHER SYMPTOMS: \"What other symptoms are you having with the bleeding?\" (e.g., back pain, burning with urination, fever)      no    Protocols used: Vaginal Bleeding - [id number]    "

## 2023-11-07 NOTE — TELEPHONE ENCOUNTER
Reason for Call:  Appointment Request    Patient requesting this type of appt:  personal, patient only would state this is regarding symptoms she was treated for in the past and they are back again    Requested provider: Lindsey Quintanilla    Reason patient unable to be scheduled: Not within requested timeframe    When does patient want to be seen/preferred time: 1-2 weeks    Comments: Patient asked if Dr. Quintanilla could work her into her schedule. Please call back    Could we send this information to you in [employer] or would you prefer to receive a phone call?:   Patient would prefer a phone call   Okay to leave a detailed message?: Yes at Cell number on file:    Telephone Information:   Mobile [phone]       Call taken on 11/7/2023 at 1:32 PM by Celia Beltran

## 2024-03-28 SDOH — HEALTH STABILITY: PHYSICAL HEALTH: ON AVERAGE, HOW MANY MINUTES DO YOU ENGAGE IN EXERCISE AT THIS LEVEL?: 10 MIN

## 2024-03-28 SDOH — HEALTH STABILITY: PHYSICAL HEALTH: ON AVERAGE, HOW MANY DAYS PER WEEK DO YOU ENGAGE IN MODERATE TO STRENUOUS EXERCISE (LIKE A BRISK WALK)?: 2 DAYS

## 2024-03-28 ASSESSMENT — SOCIAL DETERMINANTS OF HEALTH (SDOH): HOW OFTEN DO YOU GET TOGETHER WITH FRIENDS OR RELATIVES?: PATIENT DECLINED

## 2024-03-29 ENCOUNTER — ANCILLARY PROCEDURE (OUTPATIENT)
Dept: GENERAL RADIOLOGY | Facility: CLINIC | Age: 64
End: 2024-03-29
Attending: FAMILY MEDICINE
Payer: OTHER GOVERNMENT

## 2024-03-29 ENCOUNTER — OFFICE VISIT (OUTPATIENT)
Dept: FAMILY MEDICINE | Facility: CLINIC | Age: 64
End: 2024-03-29
Payer: OTHER GOVERNMENT

## 2024-03-29 VITALS
TEMPERATURE: 97.2 F | BODY MASS INDEX: 50.02 KG/M2 | HEART RATE: 90 BPM | HEIGHT: 64 IN | SYSTOLIC BLOOD PRESSURE: 141 MMHG | OXYGEN SATURATION: 96 % | RESPIRATION RATE: 18 BRPM | DIASTOLIC BLOOD PRESSURE: 87 MMHG | WEIGHT: 293 LBS

## 2024-03-29 DIAGNOSIS — R63.5 ABNORMAL WEIGHT GAIN: ICD-10-CM

## 2024-03-29 DIAGNOSIS — G89.29 CHRONIC LEFT SHOULDER PAIN: ICD-10-CM

## 2024-03-29 DIAGNOSIS — E55.9 VITAMIN D DEFICIENCY: ICD-10-CM

## 2024-03-29 DIAGNOSIS — I10 BENIGN ESSENTIAL HYPERTENSION WITH TARGET BLOOD PRESSURE BELOW 140/90: ICD-10-CM

## 2024-03-29 DIAGNOSIS — Z12.11 SCREEN FOR COLON CANCER: ICD-10-CM

## 2024-03-29 DIAGNOSIS — Z13.6 CARDIOVASCULAR SCREENING; LDL GOAL LESS THAN 160: ICD-10-CM

## 2024-03-29 DIAGNOSIS — M25.512 CHRONIC LEFT SHOULDER PAIN: ICD-10-CM

## 2024-03-29 DIAGNOSIS — Z00.00 ROUTINE GENERAL MEDICAL EXAMINATION AT A HEALTH CARE FACILITY: Primary | ICD-10-CM

## 2024-03-29 DIAGNOSIS — R03.0 ELEVATED BLOOD PRESSURE READING WITHOUT DIAGNOSIS OF HYPERTENSION: ICD-10-CM

## 2024-03-29 DIAGNOSIS — E66.01 OBESITY, MORBID, BMI 50 OR HIGHER (H): ICD-10-CM

## 2024-03-29 DIAGNOSIS — N95.0 POST-MENOPAUSAL BLEEDING: ICD-10-CM

## 2024-03-29 DIAGNOSIS — N95.0 POST-MENOPAUSAL BLEEDING: Primary | ICD-10-CM

## 2024-03-29 DIAGNOSIS — Z12.4 CERVICAL CANCER SCREENING: ICD-10-CM

## 2024-03-29 LAB
ALBUMIN SERPL BCG-MCNC: 4.1 G/DL (ref 3.5–5.2)
ALP SERPL-CCNC: 81 U/L (ref 40–150)
ALT SERPL W P-5'-P-CCNC: 11 U/L (ref 0–50)
ANION GAP SERPL CALCULATED.3IONS-SCNC: 10 MMOL/L (ref 7–15)
AST SERPL W P-5'-P-CCNC: 34 U/L (ref 0–45)
BASOPHILS # BLD AUTO: 0 10E3/UL (ref 0–0.2)
BASOPHILS NFR BLD AUTO: 0 %
BILIRUB SERPL-MCNC: 0.2 MG/DL
BUN SERPL-MCNC: 10.3 MG/DL (ref 8–23)
CALCIUM SERPL-MCNC: 9.6 MG/DL (ref 8.8–10.2)
CHLORIDE SERPL-SCNC: 105 MMOL/L (ref 98–107)
CHOLEST SERPL-MCNC: 182 MG/DL
CREAT SERPL-MCNC: 0.84 MG/DL (ref 0.51–0.95)
DEPRECATED HCO3 PLAS-SCNC: 25 MMOL/L (ref 22–29)
EGFRCR SERPLBLD CKD-EPI 2021: 79 ML/MIN/1.73M2
EOSINOPHIL # BLD AUTO: 0.2 10E3/UL (ref 0–0.7)
EOSINOPHIL NFR BLD AUTO: 3 %
ERYTHROCYTE [DISTWIDTH] IN BLOOD BY AUTOMATED COUNT: 15.5 % (ref 10–15)
FASTING STATUS PATIENT QL REPORTED: YES
GLUCOSE SERPL-MCNC: 110 MG/DL (ref 70–99)
HBA1C MFR BLD: 5.5 % (ref 0–5.6)
HCT VFR BLD AUTO: 41 % (ref 35–47)
HDLC SERPL-MCNC: 66 MG/DL
HGB BLD-MCNC: 12.7 G/DL (ref 11.7–15.7)
IMM GRANULOCYTES # BLD: 0 10E3/UL
IMM GRANULOCYTES NFR BLD: 0 %
IRON BINDING CAPACITY (ROCHE): 265 UG/DL (ref 240–430)
IRON SATN MFR SERPL: 20 % (ref 15–46)
IRON SERPL-MCNC: 54 UG/DL (ref 37–145)
LDLC SERPL CALC-MCNC: 102 MG/DL
LYMPHOCYTES # BLD AUTO: 3.8 10E3/UL (ref 0.8–5.3)
LYMPHOCYTES NFR BLD AUTO: 46 %
MCH RBC QN AUTO: 26.3 PG (ref 26.5–33)
MCHC RBC AUTO-ENTMCNC: 31 G/DL (ref 31.5–36.5)
MCV RBC AUTO: 85 FL (ref 78–100)
MONOCYTES # BLD AUTO: 0.7 10E3/UL (ref 0–1.3)
MONOCYTES NFR BLD AUTO: 8 %
NEUTROPHILS # BLD AUTO: 3.6 10E3/UL (ref 1.6–8.3)
NEUTROPHILS NFR BLD AUTO: 43 %
NONHDLC SERPL-MCNC: 116 MG/DL
PLATELET # BLD AUTO: 243 10E3/UL (ref 150–450)
POTASSIUM SERPL-SCNC: 4.1 MMOL/L (ref 3.4–5.3)
PROT SERPL-MCNC: 8.9 G/DL (ref 6.4–8.3)
RBC # BLD AUTO: 4.83 10E6/UL (ref 3.8–5.2)
SODIUM SERPL-SCNC: 140 MMOL/L (ref 135–145)
TRIGL SERPL-MCNC: 70 MG/DL
TSH SERPL DL<=0.005 MIU/L-ACNC: 3.31 UIU/ML (ref 0.3–4.2)
VIT D+METAB SERPL-MCNC: 42 NG/ML (ref 20–50)
WBC # BLD AUTO: 8.3 10E3/UL (ref 4–11)

## 2024-03-29 PROCEDURE — 99396 PREV VISIT EST AGE 40-64: CPT | Performed by: FAMILY MEDICINE

## 2024-03-29 PROCEDURE — 83036 HEMOGLOBIN GLYCOSYLATED A1C: CPT | Performed by: FAMILY MEDICINE

## 2024-03-29 PROCEDURE — 82306 VITAMIN D 25 HYDROXY: CPT | Performed by: FAMILY MEDICINE

## 2024-03-29 PROCEDURE — 80061 LIPID PANEL: CPT | Performed by: FAMILY MEDICINE

## 2024-03-29 PROCEDURE — 84443 ASSAY THYROID STIM HORMONE: CPT | Performed by: FAMILY MEDICINE

## 2024-03-29 PROCEDURE — G0145 SCR C/V CYTO,THINLAYER,RESCR: HCPCS | Performed by: FAMILY MEDICINE

## 2024-03-29 PROCEDURE — 85025 COMPLETE CBC W/AUTO DIFF WBC: CPT | Performed by: FAMILY MEDICINE

## 2024-03-29 PROCEDURE — 83550 IRON BINDING TEST: CPT | Performed by: FAMILY MEDICINE

## 2024-03-29 PROCEDURE — 80053 COMPREHEN METABOLIC PANEL: CPT | Performed by: FAMILY MEDICINE

## 2024-03-29 PROCEDURE — 83540 ASSAY OF IRON: CPT | Performed by: FAMILY MEDICINE

## 2024-03-29 PROCEDURE — 36415 COLL VENOUS BLD VENIPUNCTURE: CPT | Performed by: FAMILY MEDICINE

## 2024-03-29 PROCEDURE — 73030 X-RAY EXAM OF SHOULDER: CPT | Mod: TC | Performed by: RADIOLOGY

## 2024-03-29 PROCEDURE — 87624 HPV HI-RISK TYP POOLED RSLT: CPT | Performed by: FAMILY MEDICINE

## 2024-03-29 PROCEDURE — 99214 OFFICE O/P EST MOD 30 MIN: CPT | Mod: 25 | Performed by: FAMILY MEDICINE

## 2024-03-29 NOTE — PROGRESS NOTES
Preventive Care Visit  [facility]  Lindsey Quintanilla MD, Family Medicine  Mar 29, 2024      Assessment & Plan     (Z00.00) Routine general medical examination at a health care facility  (primary encounter diagnosis)  (Z12.4) Cervical cancer screening  Plan: Pap Screen with HPV - recommended age 30 - 65         years    (R03.0) Elevated blood pressure reading without diagnosis of hypertension  Persistent today, so I have diagnosed her with hypertension, I10, new diagnosis today.  I recommended starting lisinopril but she would like to try to lose weight, exercise more  Plan: Comprehensive metabolic panel (BMP + Alb, Alk         Phos, ALT, AST, Total. Bili, TP)        Return to clinic 10 weeks for follow up Blood pressure, weight    (M25.512,  G89.29) Chronic left shoulder pain  Comment: chronic pain but new concern, no prior workup, suspect OA, will obtain xray and Will fu as indicated.   Plan: XR Shoulder Left 2 Views          (E55.9) Vitamin D deficiency  Plan: Vitamin D Deficiency    (Z13.6) CARDIOVASCULAR SCREENING; LDL GOAL LESS THAN 160  Plan: Lipid panel reflex to direct LDL Fasting    (N95.0) Post-menopausal bleeding  Comment: persistent since last year, see HPI, EMB recommended, I discussed this again with Marilou today and she agreed to follow up with OB. See referral.  Plan: Ob/Gyn  Referral          (Z12.11) Screen for colon cancer  Comment: strongly recommended colon cancer, she declined today  Plan: continue to address and recommend.    (R63.5) Abnormal weight gain  Will evaluate for metabolic conditions as below  Plan: Hemoglobin A1c, TSH with free T4 reflex        Will fu as indicated.     (E66.01) Obesity, morbid, BMI 50 or higher (H)  Comment: see AVS, I reviewed lifestyle recommendations with her  Plan: follow up 10 weeks     Patient has been advised of split billing requirements and indicates understanding: Yes    BMI  Estimated body mass index is 53.27 kg/m   "as calculated from the following:    Height as of this encounter: 1.63 m (5' 4.17\").    Weight as of this encounter: 141.5 kg (312 lb).   Weight management plan: Discussed healthy diet and exercise guidelines    Counseling  Appropriate preventive services were discussed with this patient, including applicable screening as appropriate for fall prevention, nutrition, physical activity, Tobacco-use cessation, weight loss and cognition.  Checklist reviewing preventive services available has been given to the patient.  Reviewed patient's diet, addressing concerns and/or questions.   She is at risk for lack of exercise and has been provided with information to increase physical activity for the benefit of her well-being.   The patient was instructed to see the dentist every 6 months.     Subjective   Marilou is a 63 year old, presenting for the following:  Physical  Acute concerns:  Vaginal bleeding  She reports menopause age 52, but over the past year has had some painful vaginal spotting, intermittent and unpredictable. She had an evaluation last year including a vaginal US that did show a thickened endometrium, visualization somewhat complicated by presence of fibroids.    Dr. Garner, OB, saw patient 5/2/2023 strongly recommended and encouraged EMB and/or hysteroscopy to evaluate for endometrial cancer but patient declined.    She did have an EMB 2/25/2020 that was normal.    Chronic left shoulder pain  Patient reports many years of left shoulder pain, sometimes does limit her range of motion. No prior work up. OVER THE COUNTER medications as needed which do seem to help.    Abnormal weight change  She reports 20 lbs weight gain. She's been exercising less due to shoulder pain. She has tried to lift weights when she can.  She likes to walk but hasn't done this recently.    Wt Readings from Last 4 Encounters:   03/29/24 141.5 kg (312 lb)   05/02/23 134.4 kg (296 lb 4.8 oz)   03/16/23 134.6 kg (296 lb 11.2 oz)   08/03/21 " 122.5 kg (270 lb)           3/29/2024     8:23 AM   Additional Questions   Roomed by Anny   Accompanied by Self        Health Care Directive  Patient does not have a Health Care Directive or Living Will: Discussed advance care planning with patient; however, patient declined at this time.    HPI  Hypertension Follow-up    Do you check your blood pressure regularly outside of the clinic? No   Are you following a low salt diet? No  Are your blood pressures ever more than 140 on the top number (systolic) OR more   than 90 on the bottom number (diastolic), for example 140/90? Doesn't know, but usually high in the office        3/28/2024   General Health   How would you rate your overall physical health? Good   Feel stress (tense, anxious, or unable to sleep) To some extent   (!) STRESS CONCERN      3/28/2024   Nutrition   Three or more servings of calcium each day? Yes   Diet: Regular (no restrictions)   How many servings of fruit and vegetables per day? (!) 2-3   How many sweetened beverages each day? 0-1         3/28/2024   Exercise   Days per week of moderate/strenous exercise 2 days   Average minutes spent exercising at this level 10 min   (!) EXERCISE CONCERN      3/28/2024   Social Factors   Frequency of gathering with friends or relatives Patient declined   Worry food won't last until get money to buy more No   Food not last or not have enough money for food? No   Do you have housing?  Yes   Are you worried about losing your housing? No   Lack of transportation? No   Unable to get utilities (heat,electricity)? No         3/28/2024   Fall Risk   Fallen 2 or more times in the past year? No   Trouble with walking or balance? No          3/28/2024   Dental   Dentist two times every year? (!) NO         3/28/2024   TB Screening   Were you born outside of the [place]? No         Today's PHQ-2 Score:       3/28/2024     1:49 PM   PHQ-2 ( 1999 Pfizer)   Q1: Little interest or pleasure in doing things 1   Q2: Feeling down,  depressed or hopeless 1   PHQ-2 Score 2   Q1: Little interest or pleasure in doing things Several days   Q2: Feeling down, depressed or hopeless Several days   PHQ-2 Score 2           3/28/2024   Substance Use   Alcohol more than 3/day or more than 7/wk Not Applicable   Do you use any other substances recreationally? No     Social History     Tobacco Use    Smoking status: Never    Smokeless tobacco: Never   Substance Use Topics    Alcohol use: No    Drug use: No             3/28/2024   Breast Cancer Screening   Family history of breast, colon, or ovarian cancer? No / Unknown      Mammogram Screening - Mammogram every 1-2 years updated in Health Maintenance based on mutual decision making        3/28/2024   STI Screening   New sexual partner(s) since last STI/HIV test? (!) DECLINE     History of abnormal Pap smear: NO - age 30-65 PAP every 5 years with negative HPV co-testing recommended        Latest Ref Rng & Units 5/10/2019    11:43 AM 5/10/2019    11:28 AM   PAP / HPV   PAP (Historical)   NIL    HPV 16 DNA NEG^Negative Negative     HPV 18 DNA NEG^Negative Negative     Other HR HPV NEG^Negative Negative       ASCVD Risk   The 10-year ASCVD risk score (Wilbur TRIPP, et al., 2019) is: 6.5%    Values used to calculate the score:      Age: 63 years      Sex: Female      Is Non- : Yes      Diabetic: No      Tobacco smoker: No      Systolic Blood Pressure: 143 mmHg      Is BP treated: No      HDL Cholesterol: 81 mg/dL      Total Cholesterol: 207 mg/dL         Reviewed and updated as needed this visit by Provider                    Past Medical History:   Diagnosis Date    Post-menopausal bleeding      Past Surgical History:   Procedure Laterality Date    TUBAL LIGATION       OB History    Para Term  AB Living   2 2 2 0 0 2   SAB IAB Ectopic Multiple Live Births   0 0 0 0 2      # Outcome Date GA Lbr Pravin/2nd Weight Sex Delivery Anes PTL Lv   2 Term 10/28/87 40w0d   M    "N YULISA      Name: Chance Ritter Term 82 40w0d   F   N YULISA      Name: Luis         Review of Systems  Constitutional, neuro, ENT, endocrine, pulmonary, cardiac, gastrointestinal, genitourinary, musculoskeletal, integument and psychiatric systems are negative, except as otherwise noted.     Objective    Exam  BP (!) 143/88 (BP Location: Right arm, Patient Position: Sitting, Cuff Size: Adult Large)   Pulse 90   Temp 97.2  F (36.2  C) (Temporal)   Resp 18   Ht 1.63 m (5' 4.17\")   Wt 141.5 kg (312 lb)   LMP  (LMP Unknown)   SpO2 96%   BMI 53.27 kg/m     Estimated body mass index is 53.27 kg/m  as calculated from the following:    Height as of this encounter: 1.63 m (5' 4.17\").    Weight as of this encounter: 141.5 kg (312 lb).    Physical Exam  GENERAL: alert and no distress  NECK: no adenopathy, no asymmetry, masses, or scars  RESP: lungs clear to auscultation - no rales, rhonchi or wheezes  CV: regular rate and rhythm, normal S1 S2, no S3 or S4, no murmur, click or rub, no peripheral edema  ABDOMEN: soft, nontender, no hepatosplenomegaly, no masses and bowel sounds normal   (female) w/bimanual: normal female external genitalia, normal urethral meatus, normal vaginal mucosa, normal cervix/adnexa/uterus without masses or discharge  MS: no gross musculoskeletal defects noted, no edema        Signed Electronically by: Lindsey Quintanilla MD    "

## 2024-03-29 NOTE — RESULT ENCOUNTER NOTE
Good news, your blood count and A1C are normal.  You do not have anemia and you do not have diabetes or prediabetes.    Sincerely,  Dr. Lindsey Quintanilla MD  3/29/2024

## 2024-03-29 NOTE — RESULT ENCOUNTER NOTE
Hi, great news, you do not have arthritis or other bony problems with your shoulder. I do recommend seeing a physical therapist to help you, so please let me know if you need a referral.    Sincerely,  Dr. Lindsey Quintanilla MD  3/29/2024

## 2024-03-29 NOTE — PATIENT INSTRUCTIONS
Walk 30 minutes every day.  2.  Eat a early dinner, around 6 and then don't eat anything after that.  Preventive Care Advice   This is general advice given by our system to help you stay healthy. However, your care team may have specific advice just for you. Please talk to your care team about your preventive care needs.  Nutrition  Eat 5 or more servings of fruits and vegetables each day.  Try wheat bread, brown rice and whole grain pasta (instead of white bread, rice, and pasta).  Get enough calcium and vitamin D. Check the label on foods and aim for 100% of the RDA (recommended daily allowance).  Lifestyle  Exercise at least 150 minutes each week   (30 minutes a day, 5 days a week).  Do muscle strengthening activities 2 days a week. These help control your weight and prevent disease.  No smoking.  Wear sunscreen to prevent skin cancer.  Have a dental exam and cleaning every 6 months.  Yearly exams  See your health care team every year to talk about:  Any changes in your health.  Any medicines your care team has prescribed.  Preventive care, family planning, and ways to prevent chronic diseases.  Shots (vaccines)   HPV shots (up to age 26), if you've never had them before.  Hepatitis B shots (up to age 59), if you've never had them before.  COVID-19 shot: Get this shot when it's due.  Flu shot: Get a flu shot every year.  Tetanus shot: Get a tetanus shot every 10 years.  Pneumococcal, hepatitis A, and RSV shots: Ask your care team if you need these based on your risk.  Shingles shot (for age 50 and up).  General health tests  Diabetes screening:  Starting at age 35, Get screened for diabetes at least every 3 years.  If you are younger than age 35, ask your care team if you should be screened for diabetes.  Cholesterol test: At age 39, start having a cholesterol test every 5 years, or more often if advised.  Bone density scan (DEXA): At age 50, ask your care team if you should have this scan for osteoporosis  (brittle bones).  Hepatitis C: Get tested at least once in your life.  STIs (sexually transmitted infections)  Before age 24: Ask your care team if you should be screened for STIs.  After age 24: Get screened for STIs if you're at risk. You are at risk for STIs (including HIV) if:  You are sexually active with more than one person.  You don't use condoms every time.  You or a partner was diagnosed with a sexually transmitted infection.  If you are at risk for HIV, ask about PrEP medicine to prevent HIV.  Get tested for HIV at least once in your life, whether you are at risk for HIV or not.  Cancer screening tests  Cervical cancer screening: If you have a cervix, begin getting regular cervical cancer screening tests at age 21. Most people who have regular screenings with normal results can stop after age 65. Talk about this with your provider.  Breast cancer scan (mammogram): If you've ever had breasts, begin having regular mammograms starting at age 40. This is a scan to check for breast cancer.  Colon cancer screening: It is important to start screening for colon cancer at age 45.  Have a colonoscopy test every 10 years (or more often if you're at risk) Or, ask your provider about stool tests like a FIT test every year or Cologuard test every 3 years.  To learn more about your testing options, visit: https://www.joiz/710720.pdf.  For help making a decision, visit: https://bit.ly/zs58792.  Prostate cancer screening test: If you have a prostate and are age 55 to 69, ask your provider if you would benefit from a yearly prostate cancer screening test.  Lung cancer screening: If you are a current or former smoker age 50 to 80, ask your care team if ongoing lung cancer screenings are right for you.  For informational purposes only. Not to replace the advice of your health care provider. Copyright   2023 [employer]. All rights reserved. Clinically reviewed by the [facility] Transitions Program.  [employer] [id number] - REV 01/24.    Learning About Stress  What is stress?     Stress is your body's response to a hard situation. Your body can have a physical, emotional, or mental response. Stress is a fact of life for most people, and it affects everyone differently. What causes stress for you may not be stressful for someone else.  A lot of things can cause stress. You may feel stress when you go on a job interview, take a test, or run a race. This kind of short-term stress is normal and even useful. It can help you if you need to work hard or react quickly. For example, stress can help you finish an important job on time.  Long-term stress is caused by ongoing stressful situations or events. Examples of long-term stress include long-term health problems, ongoing problems at work, or conflicts in your family. Long-term stress can harm your health.  How does stress affect your health?  When you are stressed, your body responds as though you are in danger. It makes hormones that speed up your heart, make you breathe faster, and give you a burst of energy. This is called the fight-or-flight stress response. If the stress is over quickly, your body goes back to normal and no harm is done.  But if stress happens too often or lasts too long, it can have bad effects. Long-term stress can make you more likely to get sick, and it can make symptoms of some diseases worse. If you tense up when you are stressed, you may develop neck, shoulder, or low back pain. Stress is linked to high blood pressure and heart disease.  Stress also harms your emotional health. It can make you brown, tense, or depressed. Your relationships may suffer, and you may not do well at work or school.  What can you do to manage stress?  You can try these things to help manage stress:   Do something active. Exercise or activity can help reduce stress. Walking is a great way to get started. Even everyday activities such as housecleaning or yard work  can help.  Try yoga or camilo chi. These techniques combine exercise and meditation. You may need some training at first to learn them.  Do something you enjoy. For example, listen to music or go to a movie. Practice your hobby or do volunteer work.  Meditate. This can help you relax, because you are not worrying about what happened before or what may happen in the future.  Do guided imagery. Imagine yourself in any setting that helps you feel calm. You can use online videos, books, or a teacher to guide you.  Do breathing exercises. For example:  From a standing position, bend forward from the waist with your knees slightly bent. Let your arms dangle close to the floor.  Breathe in slowly and deeply as you return to a standing position. Roll up slowly and lift your head last.  Hold your breath for just a few seconds in the standing position.  Breathe out slowly and bend forward from the waist.  Let your feelings out. Talk, laugh, cry, and express anger when you need to. Talking with supportive friends or family, a counselor, or a yolanda leader about your feelings is a healthy way to relieve stress. Avoid discussing your feelings with people who make you feel worse.  Write. It may help to write about things that are bothering you. This helps you find out how much stress you feel and what is causing it. When you know this, you can find better ways to cope.  What can you do to prevent stress?  You might try some of these things to help prevent stress:  Manage your time. This helps you find time to do the things you want and need to do.  Get enough sleep. Your body recovers from the stresses of the day while you are sleeping.  Get support. Your family, friends, and community can make a difference in how you experience stress.  Limit your news feed. Avoid or limit time on social media or news that may make you feel stressed.  Do something active. Exercise or activity can help reduce stress. Walking is a great way to get  "started.  Where can you learn more?  Go to https://www.[employer].net/patiented  Enter N032 in the search box to learn more about \"Learning About Stress.\"  Current as of: October 24, 2023               Content Version: 14.0    9106-1326 [employer].   Care instructions adapted under license by your healthcare professional. If you have questions about a medical condition or this instruction, always ask your healthcare professional. [employer] disclaims any warranty or liability for your use of this information.      "

## 2024-04-02 LAB
BKR LAB AP GYN ADEQUACY: NORMAL
BKR LAB AP GYN INTERPRETATION: NORMAL
BKR LAB AP HPV REFLEX: NORMAL
BKR LAB AP LMP: NORMAL
BKR LAB AP PREVIOUS ABNORMAL: NORMAL
PATH REPORT.COMMENTS IMP SPEC: NORMAL
PATH REPORT.COMMENTS IMP SPEC: NORMAL
PATH REPORT.RELEVANT HX SPEC: NORMAL

## 2024-04-02 NOTE — RESULT ENCOUNTER NOTE
The rest of your labs are back and look pretty good. Your cholesterol looks great!    Your vitamin D is normal, good job. The testing of your blood sugar, kidney function, liver function and electrolytes was normal.  Your iron levels are normal. Your thyroid is normal as well.    Your blood count is basically normal, it does look like you're having a little blood loss from the vaginal bleeding.    Please follow up with the OB as we discussed.    If you have any questions, please contact the clinic or schedule an appointment with me, thank you!      Sincerely,  Dr. Lindsey Quintanilla MD  4/2/2024

## 2024-04-04 LAB
HUMAN PAPILLOMA VIRUS 16 DNA: NEGATIVE
HUMAN PAPILLOMA VIRUS 18 DNA: NEGATIVE
HUMAN PAPILLOMA VIRUS FINAL DIAGNOSIS: NORMAL
HUMAN PAPILLOMA VIRUS OTHER HR: NEGATIVE

## 2024-04-16 ENCOUNTER — MYC MEDICAL ADVICE (OUTPATIENT)
Dept: OBGYN | Facility: CLINIC | Age: 64
End: 2024-04-16
Payer: OTHER GOVERNMENT

## 2024-12-22 ENCOUNTER — HEALTH MAINTENANCE LETTER (OUTPATIENT)
Age: 64
End: 2024-12-22

## 2025-02-24 ENCOUNTER — ANCILLARY PROCEDURE (OUTPATIENT)
Dept: MAMMOGRAPHY | Facility: CLINIC | Age: 65
End: 2025-02-24
Attending: FAMILY MEDICINE
Payer: OTHER GOVERNMENT

## 2025-02-24 DIAGNOSIS — Z12.31 VISIT FOR SCREENING MAMMOGRAM: ICD-10-CM

## 2025-02-24 PROCEDURE — 77067 SCR MAMMO BI INCL CAD: CPT | Mod: TC | Performed by: RADIOLOGY

## 2025-02-24 PROCEDURE — 77063 BREAST TOMOSYNTHESIS BI: CPT | Mod: TC | Performed by: RADIOLOGY

## 2025-04-07 SDOH — HEALTH STABILITY: PHYSICAL HEALTH: ON AVERAGE, HOW MANY MINUTES DO YOU ENGAGE IN EXERCISE AT THIS LEVEL?: 10 MIN

## 2025-04-07 SDOH — HEALTH STABILITY: PHYSICAL HEALTH: ON AVERAGE, HOW MANY DAYS PER WEEK DO YOU ENGAGE IN MODERATE TO STRENUOUS EXERCISE (LIKE A BRISK WALK)?: 3 DAYS

## 2025-04-07 ASSESSMENT — SOCIAL DETERMINANTS OF HEALTH (SDOH): HOW OFTEN DO YOU GET TOGETHER WITH FRIENDS OR RELATIVES?: ONCE A WEEK

## 2025-04-10 ENCOUNTER — OFFICE VISIT (OUTPATIENT)
Dept: FAMILY MEDICINE | Facility: CLINIC | Age: 65
End: 2025-04-10
Attending: FAMILY MEDICINE
Payer: OTHER GOVERNMENT

## 2025-04-10 ENCOUNTER — ORDERS ONLY (AUTO-RELEASED) (OUTPATIENT)
Dept: FAMILY MEDICINE | Facility: CLINIC | Age: 65
End: 2025-04-10

## 2025-04-10 VITALS
BODY MASS INDEX: 48.82 KG/M2 | WEIGHT: 293 LBS | SYSTOLIC BLOOD PRESSURE: 128 MMHG | HEART RATE: 102 BPM | TEMPERATURE: 97.1 F | HEIGHT: 65 IN | RESPIRATION RATE: 16 BRPM | DIASTOLIC BLOOD PRESSURE: 82 MMHG | OXYGEN SATURATION: 97 %

## 2025-04-10 DIAGNOSIS — G62.9 NEUROPATHY: ICD-10-CM

## 2025-04-10 DIAGNOSIS — R73.01 IMPAIRED FASTING GLUCOSE: ICD-10-CM

## 2025-04-10 DIAGNOSIS — Z12.11 SCREEN FOR COLON CANCER: ICD-10-CM

## 2025-04-10 DIAGNOSIS — R79.89 ABNORMAL CBC: ICD-10-CM

## 2025-04-10 DIAGNOSIS — D25.9 UTERINE LEIOMYOMA, UNSPECIFIED LOCATION: ICD-10-CM

## 2025-04-10 DIAGNOSIS — I10 BENIGN ESSENTIAL HYPERTENSION WITH TARGET BLOOD PRESSURE BELOW 140/90: ICD-10-CM

## 2025-04-10 DIAGNOSIS — Z13.1 SCREENING FOR DIABETES MELLITUS: ICD-10-CM

## 2025-04-10 DIAGNOSIS — N95.0 POST-MENOPAUSAL BLEEDING: ICD-10-CM

## 2025-04-10 DIAGNOSIS — Z00.00 ROUTINE GENERAL MEDICAL EXAMINATION AT A HEALTH CARE FACILITY: Primary | ICD-10-CM

## 2025-04-10 PROBLEM — J06.9 RECURRENT URI (UPPER RESPIRATORY INFECTION): Status: RESOLVED | Noted: 2020-04-14 | Resolved: 2025-04-10

## 2025-04-10 LAB
ANION GAP SERPL CALCULATED.3IONS-SCNC: 10 MMOL/L (ref 7–15)
BASOPHILS # BLD AUTO: 0 10E3/UL (ref 0–0.2)
BASOPHILS NFR BLD AUTO: 0 %
BUN SERPL-MCNC: 10 MG/DL (ref 8–23)
CALCIUM SERPL-MCNC: 9.6 MG/DL (ref 8.8–10.4)
CHLORIDE SERPL-SCNC: 105 MMOL/L (ref 98–107)
CREAT SERPL-MCNC: 0.82 MG/DL (ref 0.51–0.95)
EGFRCR SERPLBLD CKD-EPI 2021: 80 ML/MIN/1.73M2
EOSINOPHIL # BLD AUTO: 0.2 10E3/UL (ref 0–0.7)
EOSINOPHIL NFR BLD AUTO: 3 %
ERYTHROCYTE [DISTWIDTH] IN BLOOD BY AUTOMATED COUNT: 15.5 % (ref 10–15)
EST. AVERAGE GLUCOSE BLD GHB EST-MCNC: 120 MG/DL
FERRITIN SERPL-MCNC: 82 NG/ML (ref 11–328)
FOLATE SERPL-MCNC: 13.4 NG/ML (ref 4.6–34.8)
GLUCOSE SERPL-MCNC: 98 MG/DL (ref 70–99)
HBA1C MFR BLD: 5.8 % (ref 0–5.6)
HCO3 SERPL-SCNC: 26 MMOL/L (ref 22–29)
HCT VFR BLD AUTO: 41.3 % (ref 35–47)
HGB BLD-MCNC: 12.6 G/DL (ref 11.7–15.7)
IMM GRANULOCYTES # BLD: 0 10E3/UL
IMM GRANULOCYTES NFR BLD: 0 %
IRON BINDING CAPACITY (ROCHE): 257 UG/DL (ref 240–430)
IRON SATN MFR SERPL: 29 % (ref 15–46)
IRON SERPL-MCNC: 75 UG/DL (ref 37–145)
LYMPHOCYTES # BLD AUTO: 3.2 10E3/UL (ref 0.8–5.3)
LYMPHOCYTES NFR BLD AUTO: 48 %
MCH RBC QN AUTO: 25.8 PG (ref 26.5–33)
MCHC RBC AUTO-ENTMCNC: 30.5 G/DL (ref 31.5–36.5)
MCV RBC AUTO: 85 FL (ref 78–100)
MONOCYTES # BLD AUTO: 0.5 10E3/UL (ref 0–1.3)
MONOCYTES NFR BLD AUTO: 8 %
NEUTROPHILS # BLD AUTO: 2.7 10E3/UL (ref 1.6–8.3)
NEUTROPHILS NFR BLD AUTO: 41 %
PLATELET # BLD AUTO: 211 10E3/UL (ref 150–450)
POTASSIUM SERPL-SCNC: 3.8 MMOL/L (ref 3.4–5.3)
RBC # BLD AUTO: 4.89 10E6/UL (ref 3.8–5.2)
SODIUM SERPL-SCNC: 141 MMOL/L (ref 135–145)
TSH SERPL DL<=0.005 MIU/L-ACNC: 2.03 UIU/ML (ref 0.3–4.2)
VIT B12 SERPL-MCNC: 495 PG/ML (ref 232–1245)
WBC # BLD AUTO: 6.7 10E3/UL (ref 4–11)

## 2025-04-10 ASSESSMENT — PAIN SCALES - GENERAL: PAINLEVEL_OUTOF10: NO PAIN (0)

## 2025-04-10 NOTE — PATIENT INSTRUCTIONS
Pelvic UltrasoundPlease call [facility] to schedule an outpatient appointment for your pelvic ultrasound at [phone].      Patient Education   Preventive Care Advice   This is general advice given by our system to help you stay healthy. However, your care team may have specific advice just for you. Please talk to your care team about your preventive care needs.  Nutrition  Eat 5 or more servings of fruits and vegetables each day.  Try wheat bread, brown rice and whole grain pasta (instead of white bread, rice, and pasta).  Get enough calcium and vitamin D. Check the label on foods and aim for 100% of the RDA (recommended daily allowance).  Lifestyle  Exercise at least 150 minutes each week  (30 minutes a day, 5 days a week).  Do muscle strengthening activities 2 days a week. These help control your weight and prevent disease.  No smoking.  Wear sunscreen to prevent skin cancer.  Have a dental exam and cleaning every 6 months.  Yearly exams  See your health care team every year to talk about:  Any changes in your health.  Any medicines your care team has prescribed.  Preventive care, family planning, and ways to prevent chronic diseases.  Shots (vaccines)   HPV shots (up to age 26), if you've never had them before.  Hepatitis B shots (up to age 59), if you've never had them before.  COVID-19 shot: Get this shot when it's due.  Flu shot: Get a flu shot every year.  Tetanus shot: Get a tetanus shot every 10 years.  Pneumococcal, hepatitis A, and RSV shots: Ask your care team if you need these based on your risk.  Shingles shot (for age 50 and up)  General health tests  Diabetes screening:  Starting at age 35, Get screened for diabetes at least every 3 years.  If you are younger than age 35, ask your care team if you should be screened for diabetes.  Cholesterol test: At age 39, start having a cholesterol test every 5 years, or more often if advised.  Bone density scan (DEXA): At age 50, ask your  care team if you should have this scan for osteoporosis (brittle bones).  Hepatitis C: Get tested at least once in your life.  STIs (sexually transmitted infections)  Before age 24: Ask your care team if you should be screened for STIs.  After age 24: Get screened for STIs if you're at risk. You are at risk for STIs (including HIV) if:  You are sexually active with more than one person.  You don't use condoms every time.  You or a partner was diagnosed with a sexually transmitted infection.  If you are at risk for HIV, ask about PrEP medicine to prevent HIV.  Get tested for HIV at least once in your life, whether you are at risk for HIV or not.  Cancer screening tests  Cervical cancer screening: If you have a cervix, begin getting regular cervical cancer screening tests starting at age 21.  Breast cancer scan (mammogram): If you've ever had breasts, begin having regular mammograms starting at age 40. This is a scan to check for breast cancer.  Colon cancer screening: It is important to start screening for colon cancer at age 45.  Have a colonoscopy test every 10 years (or more often if you're at risk) Or, ask your provider about stool tests like a FIT test every year or Cologuard test every 3 years.  To learn more about your testing options, visit:   .  For help making a decision, visit:   https://bit.ly/aj40926.  Prostate cancer screening test: If you have a prostate, ask your care team if a prostate cancer screening test (PSA) at age 55 is right for you.  Lung cancer screening: If you are a current or former smoker ages 50 to 80, ask your care team if ongoing lung cancer screenings are right for you.  For informational purposes only. Not to replace the advice of your health care provider. Copyright   2023 [employer]. All rights reserved. Clinically reviewed by the [facility] Transitions Program. [employer] [id number] - REV 01/24.  Learning About Stress  What is stress?     Stress is your body's  response to a hard situation. Your body can have a physical, emotional, or mental response. Stress is a fact of life for most people, and it affects everyone differently. What causes stress for you may not be stressful for someone else.  A lot of things can cause stress. You may feel stress when you go on a job interview, take a test, or run a race. This kind of short-term stress is normal and even useful. It can help you if you need to work hard or react quickly. For example, stress can help you finish an important job on time.  Long-term stress is caused by ongoing stressful situations or events. Examples of long-term stress include long-term health problems, ongoing problems at work, or conflicts in your family. Long-term stress can harm your health.  How does stress affect your health?  When you are stressed, your body responds as though you are in danger. It makes hormones that speed up your heart, make you breathe faster, and give you a burst of energy. This is called the fight-or-flight stress response. If the stress is over quickly, your body goes back to normal and no harm is done.  But if stress happens too often or lasts too long, it can have bad effects. Long-term stress can make you more likely to get sick, and it can make symptoms of some diseases worse. If you tense up when you are stressed, you may develop neck, shoulder, or low back pain. Stress is linked to high blood pressure and heart disease.  Stress also harms your emotional health. It can make you brown, tense, or depressed. Your relationships may suffer, and you may not do well at work or school.  What can you do to manage stress?  You can try these things to help manage stress:   Do something active. Exercise or activity can help reduce stress. Walking is a great way to get started. Even everyday activities such as housecleaning or yard work can help.  Try yoga or camilo chi. These techniques combine exercise and meditation. You may need some  training at first to learn them.  Do something you enjoy. For example, listen to music or go to a movie. Practice your hobby or do volunteer work.  Meditate. This can help you relax, because you are not worrying about what happened before or what may happen in the future.  Do guided imagery. Imagine yourself in any setting that helps you feel calm. You can use online videos, books, or a teacher to guide you.  Do breathing exercises. For example:  From a standing position, bend forward from the waist with your knees slightly bent. Let your arms dangle close to the floor.  Breathe in slowly and deeply as you return to a standing position. Roll up slowly and lift your head last.  Hold your breath for just a few seconds in the standing position.  Breathe out slowly and bend forward from the waist.  Let your feelings out. Talk, laugh, cry, and express anger when you need to. Talking with supportive friends or family, a counselor, or a yolanda leader about your feelings is a healthy way to relieve stress. Avoid discussing your feelings with people who make you feel worse.  Write. It may help to write about things that are bothering you. This helps you find out how much stress you feel and what is causing it. When you know this, you can find better ways to cope.  What can you do to prevent stress?  You might try some of these things to help prevent stress:  Manage your time. This helps you find time to do the things you want and need to do.  Get enough sleep. Your body recovers from the stresses of the day while you are sleeping.  Get support. Your family, friends, and community can make a difference in how you experience stress.  Limit your news feed. Avoid or limit time on social media or news that may make you feel stressed.  Do something active. Exercise or activity can help reduce stress. Walking is a great way to get started.  Where can you learn more?  Go to https://www.healthwise.net/patiented  Enter N032 in the  "search box to learn more about \"Learning About Stress.\"  Current as of: October 24, 2024  Content Version: 14.4 2024-2025 [employer].   Care instructions adapted under license by your healthcare professional. If you have questions about a medical condition or this instruction, always ask your healthcare professional. [employer] disclaims any warranty or liability for your use of this information.       "

## 2025-04-10 NOTE — RESULT ENCOUNTER NOTE
Thank you very much for getting labs done!     You do not have anemia but your red blood cells have less iron content than usual, so I will add on some labs to check for low iron.    Your lab test to check for diabetes, HgA1C, also called glycosylated hemoglobin, which measures the level of sugar in your blood over the past few months, is slightly higher than normal but less than 6.5. This is good news, it means you don't have diabetes right now!  However, it does mean that you're at risk for developing diabetes in the future. This could be causing the color change (skin darkening) that you've noticed.    To lower your blood sugars,  reduce carbohydrates/sugar in your diet by reducing portion size of carbohydrates or avoiding sweets, juice, alcohol, white bread, crackers, pasta, rice, potatoes and cereal. Substitute whole grains for white bread and pasta.    You can also reduce your blood sugars by increasing your activity level. Exercise for at least 30 min 5 times per week, and lift weights 2-3 times per week to build muscle mass and improve your metabolism. Going for even a short walk after eating will also lower your blood sugar.    If you have any questions, please contact the clinic or schedule an appointment with me, thank you!    Sincerely,  Dr. Lindsey Quintanilla MD  4/10/2025

## 2025-04-10 NOTE — NURSING NOTE
Prior to immunization administration, verified patients identity using patient s name and date of birth. Please see Immunization Activity for additional information.     Screening Questionnaire for Adult Immunization    Are you sick today?   No   Do you have allergies to medications, food, a vaccine component or latex?   No   Have you ever had a serious reaction after receiving a vaccination?   No   Do you have a long-term health problem with heart, lung, kidney, or metabolic disease (e.g., diabetes), asthma, a blood disorder, no spleen, complement component deficiency, a cochlear implant, or a spinal fluid leak?  Are you on long-term aspirin therapy?   No   Do you have cancer, leukemia, HIV/AIDS, or any other immune system problem?   No   Do you have a parent, brother, or sister with an immune system problem?   No   In the past 3 months, have you taken medications that affect  your immune system, such as prednisone, other steroids, or anticancer drugs; drugs for the treatment of rheumatoid arthritis, Crohn s disease, or psoriasis; or have you had radiation treatments?   No   Have you had a seizure, or a brain or other nervous system problem?   No   During the past year, have you received a transfusion of blood or blood    products, or been given immune (gamma) globulin or antiviral drug?   No   For women: Are you pregnant or is there a chance you could become       pregnant during the next month?   No   Have you received any vaccinations in the past 4 weeks?   No     Immunization questionnaire answers were all negative.      Patient instructed to remain in clinic for 15 minutes afterwards, and to report any adverse reactions.     Screening performed by Sheree Ty MA on 4/10/2025 at 8:49 AM.

## 2025-04-10 NOTE — PROGRESS NOTES
"Preventive Care Visit  [facility]  Lindsey Quintanilla MD, Family Medicine  Apr 10, 2025      Assessment & Plan     (Z00.00) Routine general medical examination at a health care facility  (primary encounter diagnosis)    (I10) Benign essential hypertension with target blood pressure below 140/90  Comment: At goal  The current medical regimen is effective;  continue present plan and medications.    Plan: BASIC METABOLIC PANEL          (G62.9) Neuropathy  Comment: New, previously undiagnosed problem with uncertain prognosis and additional work-up planned.   New concern. Will evaluate for vitamin deficiency, diabetes, thyroid disorder. Will also evaluate for possible PVD, see orders.    Plan: Hemoglobin A1c, Vitamin B12, Folate, TSH with         free T4 reflex, CBC with platelets and         differential, US KAMI Doppler with Exercise         Bilateral        Will fu as indicated.     (N95.0) Post-menopausal bleeding  Comment: ongoing, undiagnosed problem with uncertain prognosis and additional work-up planned.   See HPI, I recommend imaging and referral to specialist for further evaluation and treatment.  Plan: Ob/Gyn  Referral, US Pelvic Complete         with Transvaginal          Note prior hx of (D25.9) Uterine leiomyoma, unspecified location  Plan: Ob/Gyn  Referral, US Pelvic Complete         with Transvaginal    (Z13.1) Screening for diabetes mellitus    (Z12.11) Screen for colon cancer  Plan: [facility]          Patient has been advised of split billing requirements and indicates understanding: Yes        BMI  Estimated body mass index is 52.24 kg/m  as calculated from the following:    Height as of this encounter: 1.651 m (5' 5\").    Weight as of this encounter: 142.4 kg (313 lb 14.4 oz).   Weight management plan: see AVS for lifestyle recommendations.    Counseling  Appropriate preventive services were addressed with this patient via screening, " questionnaire, or discussion as appropriate for fall prevention, nutrition, physical activity, Tobacco-use cessation, social engagement, weight loss and cognition.  Checklist reviewing preventive services available has been given to the patient.  Reviewed patient's diet, addressing concerns and/or questions.   She is at risk for lack of exercise and has been provided with information to increase physical activity for the benefit of her well-being.   The patient was instructed to see the dentist every 6 months.   She is at risk for psychosocial distress and has been provided with information to reduce risk.       Subjective   Marilou is a 64 year old, presenting for the following:  Physical        4/10/2025     8:01 AM   Additional Questions   Roomed by Mindy HILL  Post menopausal bleeding  Marilou reports that menopause was around age 52 and about 5 years ago had a few episodes of  bleeding, she had an us and EMB that showed endometrial atrophy.  She then didn't have bleeding again fall of 2022, she has had several episodes of light spotting off and on since then, typically lasting several days.  She has known uterine fibroids.    She had a pelvic US 5/2/2023 which did show a thickened endometrium. It appears that an endometrial biopsy was recommended but she declined because she had one in the past and she had thought it would treat the bleeding, so didn't want to repeat it if it didn't treat the problem. I did discuss with her that the point was to try to determine what was causing the thickening, not to treat the bleeding per say.  She had an endometrial biopsy 2/25/2020, normal results.     Wt Readings from Last 4 Encounters:   04/10/25 (!) 142.4 kg (313 lb 14.4 oz)   03/29/24 141.5 kg (312 lb)   05/02/23 134.4 kg (296 lb 4.8 oz)   03/16/23 134.6 kg (296 lb 11.2 oz)     Leg concerns  Marilou reports new symptoms of feet feeling cold, color changes (darkening of her feet, some paler areas of her legs), and she  feels she has a change in shape of her legs. She denies weakness, falls or tingling. She eats a regular diet, denies being vegan.    Hypertension Follow-up    Do you check your blood pressure regularly outside of the clinic? Yes   Are you following a low salt diet? Yes  Are your blood pressures ever more than 140 on the top number (systolic) OR more   than 90 on the bottom number (diastolic), for example 140/90? No    BP Readings from Last 2 Encounters:   04/10/25 128/82   03/29/24 (!) 141/87     Advance Care Planning  Patient does not have a Health Care Directive: Discussed advance care planning with patient; information given to patient to review.      4/7/2025   General Health   How would you rate your overall physical health? Good   Feel stress (tense, anxious, or unable to sleep) To some extent   (!) STRESS CONCERN      4/7/2025   Nutrition   Three or more servings of calcium each day? Yes   Diet: Regular (no restrictions)   How many servings of fruit and vegetables per day? (!) 2-3   How many sweetened beverages each day? 0-1         4/7/2025   Exercise   Days per week of moderate/strenous exercise 3 days   Average minutes spent exercising at this level 10 min         4/7/2025   Social Factors   Frequency of gathering with friends or relatives Once a week   Worry food won't last until get money to buy more No   Food not last or not have enough money for food? No   Do you have housing? (Housing is defined as stable permanent housing and does not include staying ouside in a car, in a tent, in an abandoned building, in an overnight shelter, or couch-surfing.) Yes   Are you worried about losing your housing? No   Lack of transportation? No   Unable to get utilities (heat,electricity)? No         4/7/2025   Fall Risk   Fallen 2 or more times in the past year? No   Trouble with walking or balance? No          4/7/2025   Dental   Dentist two times every year? (!) NO           3/28/2024   TB Screening   Were you born  outside of the [place]? No           Today's PHQ-2 Score:       4/10/2025     6:20 AM   PHQ-2 ( 1999 Pfizer)   Q1: Little interest or pleasure in doing things 1   Q2: Feeling down, depressed or hopeless 1   PHQ-2 Score 2    Q1: Little interest or pleasure in doing things Several days   Q2: Feeling down, depressed or hopeless Several days   PHQ-2 Score 2       Patient-reported           4/7/2025   Substance Use   Alcohol more than 3/day or more than 7/wk Not Applicable   Do you use any other substances recreationally? No     Social History     Tobacco Use    Smoking status: Never    Smokeless tobacco: Never   Vaping Use    Vaping status: Never Used   Substance Use Topics    Alcohol use: No    Drug use: No           2/24/2025   LAST FHS-7 RESULTS   1st degree relative breast or ovarian cancer No   Any relative bilateral breast cancer No   Any male have breast cancer No   Any ONE woman have BOTH breast AND ovarian cancer No   Any woman with breast cancer before 50yrs No   2 or more relatives with breast AND/OR ovarian cancer No   2 or more relatives with breast AND/OR bowel cancer No        Mammogram Screening - Mammogram every 1-2 years updated in Health Maintenance based on mutual decision making        4/7/2025   STI Screening   New sexual partner(s) since last STI/HIV test? No     History of abnormal Pap smear: No - age 30-64 HPV with reflex Pap every 5 years recommended        Latest Ref Rng & Units 3/29/2024     9:06 AM 5/10/2019    11:43 AM 5/10/2019    11:28 AM   PAP / HPV   PAP  Negative for Intraepithelial Lesion or Malignancy (NILM)      PAP (Historical)    NIL    HPV 16 DNA Negative Negative  Negative     HPV 18 DNA Negative Negative  Negative     Other HR HPV Negative Negative  Negative       ASCVD Risk   The 10-year ASCVD risk score (Wilbur TRIPP, et al., 2019) is: 5.2%    Values used to calculate the score:      Age: 64 years      Sex: Female      Is Non- : Yes      Diabetic:  "No      Tobacco smoker: No      Systolic Blood Pressure: 128 mmHg      Is BP treated: No      HDL Cholesterol: 66 mg/dL      Total Cholesterol: 182 mg/dL         Reviewed and updated as needed this visit by Provider   Tobacco  Allergies    Med Hx  Surg Hx  Fam Hx  Soc Hx Sexual   Activity          Past Medical History:   Diagnosis Date    Post-menopausal bleeding      Past Surgical History:   Procedure Laterality Date    TUBAL LIGATION       OB History    Para Term  AB Living   2 2 2 0 0 2   SAB IAB Ectopic Multiple Live Births   0 0 0 0 2      # Outcome Date GA Lbr Pravin/2nd Weight Sex Type Anes PTL Lv   2 Term 10/28/87 40w0d   M   N YULISA      Name: Chance   1 Term 82 40w0d   F   N YULISA      Name: Luis         Review of Systems  Constitutional, HEENT, cardiovascular, pulmonary, gi and gu systems are negative, except as otherwise noted.     Objective    Exam  /82 (BP Location: Right arm, Patient Position: Sitting, Cuff Size: Adult Large)   Pulse 102   Temp 97.1  F (36.2  C) (Temporal)   Resp 16   Ht 1.651 m (5' 5\")   Wt (!) 142.4 kg (313 lb 14.4 oz)   LMP  (LMP Unknown)   SpO2 97%   BMI 52.24 kg/m     Estimated body mass index is 52.24 kg/m  as calculated from the following:    Height as of this encounter: 1.651 m (5' 5\").    Weight as of this encounter: 142.4 kg (313 lb 14.4 oz).    Physical Exam  GENERAL: alert, no distress, and obese  EYES: Eyes grossly normal to inspection, PERRL and conjunctivae and sclerae normal  HENT: ear canals and TM's normal, nose and mouth without ulcers or lesions  NECK: no adenopathy, no asymmetry, masses, or scars  RESP: lungs clear to auscultation - no rales, rhonchi or wheezes  CV: regular rate and rhythm, normal S1 S2, no S3 or S4, no murmur, click or rub, no peripheral edema  ABDOMEN: soft, nontender, no hepatosplenomegaly, no masses and bowel sounds normal  MS: no gross musculoskeletal defects noted, no edema  SKIN: no " suspicious lesions or rashes; I can appreciate some skin darkening of her feet.  NEURO: Normal strength and tone, mentation intact and speech normal  PSYCH: mentation appears normal, affect normal/bright  Foot exam - both sides normal; no swelling, tenderness or skin or vascular lesions. Color and temperature is normal. Sensation is intact. Peripheral pulses are palpable. Toenails are normal.   No calf tenderness noted on palpation.    Signed Electronically by: Lindsey Quintanilla MD

## 2025-04-10 NOTE — PROGRESS NOTES
Preventive Care Visit  [facility]  Lindsey Quintanilla MD, Family Medicine  Apr 10, 2025  {Provider  Link to SmartSet :344027}    {PROVIDER CHARTING PREFERENCE:053677}    Subjective   Marilou is a 64 year old, presenting for the following:  Physical        4/10/2025     8:01 AM   Additional Questions   Roomed by Mindy HILL  ***   {MA/LPN/RN Pre-Provider Visit Orders- hCG/UA/Strep (Optional):033124}  {SUPERLIST (Optional):491248}  {additonal problems for provider to add (Optional):850648}  Advance Care Planning  Patient does not have a Health Care Directive: Discussed advance care planning with patient; however, patient declined at this time.      4/7/2025   General Health   How would you rate your overall physical health? Good   Feel stress (tense, anxious, or unable to sleep) To some extent   (!) STRESS CONCERN      4/7/2025   Nutrition   Three or more servings of calcium each day? Yes   Diet: Regular (no restrictions)   How many servings of fruit and vegetables per day? (!) 2-3   How many sweetened beverages each day? 0-1         4/7/2025   Exercise   Days per week of moderate/strenous exercise 3 days   Average minutes spent exercising at this level 10 min         4/7/2025   Social Factors   Frequency of gathering with friends or relatives Once a week   Worry food won't last until get money to buy more No   Food not last or not have enough money for food? No   Do you have housing? (Housing is defined as stable permanent housing and does not include staying ouside in a car, in a tent, in an abandoned building, in an overnight shelter, or couch-surfing.) Yes   Are you worried about losing your housing? No   Lack of transportation? No   Unable to get utilities (heat,electricity)? No         4/7/2025   Fall Risk   Fallen 2 or more times in the past year? No   Trouble with walking or balance? No          4/7/2025   Dental   Dentist two times every year? (!) NO           3/28/2024    TB Screening   Were you born outside of the [place]? No           Today's PHQ-2 Score:       4/10/2025     6:20 AM   PHQ-2 ( 1999 Pfizer)   Q1: Little interest or pleasure in doing things 1   Q2: Feeling down, depressed or hopeless 1   PHQ-2 Score 2    Q1: Little interest or pleasure in doing things Several days   Q2: Feeling down, depressed or hopeless Several days   PHQ-2 Score 2       Patient-reported           4/7/2025   Substance Use   Alcohol more than 3/day or more than 7/wk Not Applicable   Do you use any other substances recreationally? No     Social History     Tobacco Use    Smoking status: Never    Smokeless tobacco: Never   Substance Use Topics    Alcohol use: No    Drug use: No     {Provider  If there are gaps in the social history shown above, please follow the link to update and then refresh the note Link to Social and Substance History :905112}      2/24/2025   LAST FHS-7 RESULTS   1st degree relative breast or ovarian cancer No   Any relative bilateral breast cancer No   Any male have breast cancer No   Any ONE woman have BOTH breast AND ovarian cancer No   Any woman with breast cancer before 50yrs No   2 or more relatives with breast AND/OR ovarian cancer No   2 or more relatives with breast AND/OR bowel cancer No     {If any of the questions to the FHS7 are answered yes, consider referral for genetic counseling.    Additional indications for genetic referral include personal history of breast or ovarian cancer, genetic mutation in 1st degree relative which increases risk of breast cancer including BRCA1, BRCA2, JOHN, PALB 2, TP53, CHEK2, PTEN, CDH1, STK11 (per ACS) and/or 1st degree relative with history of pancreatic or high-risk prostate cancer (per NCCN):818513}   {Mammogram Decision Support (Optional):641387}        4/7/2025   STI Screening   New sexual partner(s) since last STI/HIV test? No     History of abnormal Pap smear: { :173589}        Latest Ref Rng & Units 3/29/2024     9:06 AM  "5/10/2019    11:43 AM 5/10/2019    11:28 AM   PAP / HPV   PAP  Negative for Intraepithelial Lesion or Malignancy (NILM)      PAP (Historical)    NIL    HPV 16 DNA Negative Negative  Negative     HPV 18 DNA Negative Negative  Negative     Other HR HPV Negative Negative  Negative       ASCVD Risk   The 10-year ASCVD risk score (Wilbur TRIPP, et al., 2019) is: 9.5%    Values used to calculate the score:      Age: 64 years      Sex: Female      Is Non- : Yes      Diabetic: No      Tobacco smoker: No      Systolic Blood Pressure: 161 mmHg      Is BP treated: No      HDL Cholesterol: 66 mg/dL      Total Cholesterol: 182 mg/dL    {Link to Fracture Risk Assessment Tool (Optional):049187}    {Provider  REQUIRED FOR AWV Use the storyboard to review patient history, after sections have been marked as reviewed, refresh note to capture documentation:913368}   Reviewed and updated as needed this visit by Provider                    {HISTORY OPTIONS (Optional):818857}    {ROS Picklists (Optional):158722}     Objective    Exam  BP (!) 161/83 (BP Location: Right arm, Patient Position: Sitting, Cuff Size: Adult Large)   Pulse 102   Temp 97.1  F (36.2  C) (Temporal)   Resp 16   Ht 1.651 m (5' 5\")   Wt (!) 142.4 kg (313 lb 14.4 oz)   LMP  (LMP Unknown)   SpO2 97%   BMI 52.24 kg/m     Estimated body mass index is 52.24 kg/m  as calculated from the following:    Height as of this encounter: 1.651 m (5' 5\").    Weight as of this encounter: 142.4 kg (313 lb 14.4 oz).    Physical Exam  {Exam Choices (Optional):707695}        Signed Electronically by: Lindsey Quintanilla MD  {Email feedback regarding this note to [email or web address]   :931560}  "

## 2025-06-26 ENCOUNTER — APPOINTMENT (OUTPATIENT)
Dept: GENERAL RADIOLOGY | Facility: CLINIC | Age: 65
End: 2025-06-26
Attending: EMERGENCY MEDICINE
Payer: OTHER GOVERNMENT

## 2025-06-26 ENCOUNTER — HOSPITAL ENCOUNTER (EMERGENCY)
Facility: CLINIC | Age: 65
Discharge: HOME | End: 2025-06-26
Attending: EMERGENCY MEDICINE
Payer: OTHER GOVERNMENT

## 2025-06-26 VITALS
HEART RATE: 77 BPM | BODY MASS INDEX: 46.65 KG/M2 | RESPIRATION RATE: 18 BRPM | TEMPERATURE: 98 F | HEIGHT: 65 IN | OXYGEN SATURATION: 100 % | SYSTOLIC BLOOD PRESSURE: 166 MMHG | WEIGHT: 280 LBS | DIASTOLIC BLOOD PRESSURE: 95 MMHG

## 2025-06-26 DIAGNOSIS — R07.89 ATYPICAL CHEST PAIN: ICD-10-CM

## 2025-06-26 LAB
ALBUMIN SERPL BCG-MCNC: 3.9 G/DL (ref 3.5–5.2)
ALBUMIN UR-MCNC: NEGATIVE MG/DL
ALP SERPL-CCNC: 90 U/L (ref 40–150)
ALT SERPL W P-5'-P-CCNC: 11 U/L (ref 0–50)
ANION GAP SERPL CALCULATED.3IONS-SCNC: 12 MMOL/L (ref 7–15)
APPEARANCE UR: CLEAR
AST SERPL W P-5'-P-CCNC: 41 U/L (ref 0–45)
ATRIAL RATE - MUSE: 114 BPM
BASOPHILS # BLD AUTO: 0 10E3/UL (ref 0–0.2)
BASOPHILS NFR BLD AUTO: 1 %
BILIRUB SERPL-MCNC: 0.4 MG/DL
BILIRUB UR QL STRIP: NEGATIVE
BUN SERPL-MCNC: 8.7 MG/DL (ref 8–23)
CALCIUM SERPL-MCNC: 9.5 MG/DL (ref 8.8–10.4)
CHLORIDE SERPL-SCNC: 103 MMOL/L (ref 98–107)
COLOR UR AUTO: NORMAL
CREAT SERPL-MCNC: 0.84 MG/DL (ref 0.51–0.95)
D DIMER PPP FEU-MCNC: 0.45 UG/ML FEU (ref 0–0.5)
DIASTOLIC BLOOD PRESSURE - MUSE: NORMAL MMHG
EGFRCR SERPLBLD CKD-EPI 2021: 78 ML/MIN/1.73M2
EOSINOPHIL # BLD AUTO: 0.2 10E3/UL (ref 0–0.7)
EOSINOPHIL NFR BLD AUTO: 3 %
ERYTHROCYTE [DISTWIDTH] IN BLOOD BY AUTOMATED COUNT: 15.4 % (ref 10–15)
GLUCOSE SERPL-MCNC: 107 MG/DL (ref 70–99)
GLUCOSE UR STRIP-MCNC: NEGATIVE MG/DL
HCO3 SERPL-SCNC: 23 MMOL/L (ref 22–29)
HCT VFR BLD AUTO: 39.6 % (ref 35–47)
HGB BLD-MCNC: 12.4 G/DL (ref 11.7–15.7)
HGB UR QL STRIP: NEGATIVE
IMM GRANULOCYTES # BLD: 0 10E3/UL
IMM GRANULOCYTES NFR BLD: 0 %
INTERPRETATION ECG - MUSE: NORMAL
KETONES UR STRIP-MCNC: NEGATIVE MG/DL
LEUKOCYTE ESTERASE UR QL STRIP: NEGATIVE
LYMPHOCYTES # BLD AUTO: 4.7 10E3/UL (ref 0.8–5.3)
LYMPHOCYTES NFR BLD AUTO: 53 %
MCH RBC QN AUTO: 26.5 PG (ref 26.5–33)
MCHC RBC AUTO-ENTMCNC: 31.3 G/DL (ref 31.5–36.5)
MCV RBC AUTO: 85 FL (ref 78–100)
MONOCYTES # BLD AUTO: 0.8 10E3/UL (ref 0–1.3)
MONOCYTES NFR BLD AUTO: 9 %
NEUTROPHILS # BLD AUTO: 3.2 10E3/UL (ref 1.6–8.3)
NEUTROPHILS NFR BLD AUTO: 36 %
NITRATE UR QL: NEGATIVE
NRBC # BLD AUTO: 0 10E3/UL
NRBC BLD AUTO-RTO: 0 /100
P AXIS - MUSE: 61 DEGREES
PH UR STRIP: 7 [PH] (ref 5–7)
PLAT MORPH BLD: ABNORMAL
PLATELET # BLD AUTO: 217 10E3/UL (ref 150–450)
POLYCHROMASIA BLD QL SMEAR: SLIGHT
POTASSIUM SERPL-SCNC: 3.8 MMOL/L (ref 3.4–5.3)
PR INTERVAL - MUSE: 168 MS
PROT SERPL-MCNC: 8.6 G/DL (ref 6.4–8.3)
QRS DURATION - MUSE: 70 MS
QT - MUSE: 330 MS
QTC - MUSE: 454 MS
R AXIS - MUSE: 23 DEGREES
RBC # BLD AUTO: 4.68 10E6/UL (ref 3.8–5.2)
RBC MORPH BLD: ABNORMAL
RBC URINE: 0 /HPF
SODIUM SERPL-SCNC: 138 MMOL/L (ref 135–145)
SP GR UR STRIP: 1 (ref 1–1.03)
SYSTOLIC BLOOD PRESSURE - MUSE: NORMAL MMHG
T AXIS - MUSE: 34 DEGREES
TARGETS BLD QL SMEAR: SLIGHT
TROPONIN T SERPL HS-MCNC: <6 NG/L
UROBILINOGEN UR STRIP-MCNC: NORMAL MG/DL
VARIANT LYMPHS BLD QL SMEAR: PRESENT
VENTRICULAR RATE- MUSE: 114 BPM
WBC # BLD AUTO: 8.9 10E3/UL (ref 4–11)
WBC URINE: <1 /HPF

## 2025-06-26 PROCEDURE — 85379 FIBRIN DEGRADATION QUANT: CPT | Performed by: EMERGENCY MEDICINE

## 2025-06-26 PROCEDURE — 93010 ELECTROCARDIOGRAM REPORT: CPT | Performed by: EMERGENCY MEDICINE

## 2025-06-26 PROCEDURE — 85025 COMPLETE CBC W/AUTO DIFF WBC: CPT | Performed by: EMERGENCY MEDICINE

## 2025-06-26 PROCEDURE — 82310 ASSAY OF CALCIUM: CPT | Performed by: EMERGENCY MEDICINE

## 2025-06-26 PROCEDURE — 36415 COLL VENOUS BLD VENIPUNCTURE: CPT | Performed by: EMERGENCY MEDICINE

## 2025-06-26 PROCEDURE — 71046 X-RAY EXAM CHEST 2 VIEWS: CPT

## 2025-06-26 PROCEDURE — 71046 X-RAY EXAM CHEST 2 VIEWS: CPT | Mod: 26 | Performed by: RADIOLOGY

## 2025-06-26 PROCEDURE — 93005 ELECTROCARDIOGRAM TRACING: CPT | Performed by: EMERGENCY MEDICINE

## 2025-06-26 PROCEDURE — 99284 EMERGENCY DEPT VISIT MOD MDM: CPT | Mod: 25 | Performed by: EMERGENCY MEDICINE

## 2025-06-26 PROCEDURE — 99284 EMERGENCY DEPT VISIT MOD MDM: CPT | Performed by: EMERGENCY MEDICINE

## 2025-06-26 PROCEDURE — 81001 URINALYSIS AUTO W/SCOPE: CPT | Performed by: EMERGENCY MEDICINE

## 2025-06-26 PROCEDURE — 84484 ASSAY OF TROPONIN QUANT: CPT | Performed by: EMERGENCY MEDICINE

## 2025-06-26 RX ORDER — NAPROXEN 500 MG/1
500 TABLET ORAL ONCE
Status: COMPLETED | OUTPATIENT
Start: 2025-06-26 | End: 2025-06-26

## 2025-06-26 ASSESSMENT — ACTIVITIES OF DAILY LIVING (ADL)
ADLS_ACUITY_SCORE: 41

## 2025-06-26 NOTE — ED PROVIDER NOTES
"  History     Chief Complaint   Patient presents with    Chest Pain     Patient ambulatory to triage complaining of chest pain that started about two days ago. Its mostly mid sternal and subsides if she gets up take deep breaths. B/P elevated at triage (196/106) and patient says she takes her meds regularly.      HPI  Maida Jarrett is a 64 year old female with PMH notable for HTN who presents to the ED with chest discomfort.  Patient reports symptoms started 2 days ago.  Patient points just left of inferior sternum area as location.  Pain comes and goes, last for about 1 minute at a time.  Pain resolves after repositioning.  No shortness of breath, no pain with deep breath.  No recent cough nor fever.  Pain is reproducible when pushing on the area.  She also is now having some pain in the thoracic back that started this evening.    Physical Exam   BP: (!) 196/106  Pulse: 110  Temp: 98  F (36.7  C)  Resp: 18  Height: 165.1 cm (5' 5\")  Weight: 127 kg (280 lb)  SpO2: 98 %    Physical Exam  General: no acute distress. Appears stated age.   HENT: MMM, no oropharyngeal lesions  Eyes: PERRL, normal sclerae   Cardio: Mildly tachycardic rate. Regular rhythm. Extremities well perfused  Resp: Normal work of breathing, Normal respiratory rate. Clear to auscultation bilaterally.  Chest/back: Pain reproduced with palpation along the left sternal border.  Mild tenderness across the thoracic back between the shoulders overlying the trapezius muscles  Neuro: alert without signs of confusion. CN II-XII grossly intact. Grossly normal strength and sensation in all extremities.   Psych: normal affect, normal behavior      ED Course      Procedures            EKG Interpretation:      Interpreted by Alber Barriga MD  Time reviewed: 0154  Symptoms at time of EKG: Chest pain  Rhythm: sinus tachycardia  Rate: 114  Axis: normal  Ectopy: none  Conduction: normal  ST Segments/ T Waves: No ST-T wave changes  Q Waves: none  Comparison to " prior: Compared to 7/30/2009 there is increased heart rate with otherwise similar morphology    Clinical Impression: sinus mild tachycardia without evidence of acute ischemia and unremarkable intervals         Labs Ordered and Resulted from Time of ED Arrival to Time of ED Departure   COMPREHENSIVE METABOLIC PANEL - Abnormal       Result Value    Sodium 138      Potassium 3.8      Carbon Dioxide (CO2) 23      Anion Gap 12      Urea Nitrogen 8.7      Creatinine 0.84      GFR Estimate 78      Calcium 9.5      Chloride 103      Glucose 107 (*)     Alkaline Phosphatase 90      AST 41      ALT 11      Protein Total 8.6 (*)     Albumin 3.9      Bilirubin Total 0.4     CBC WITH PLATELETS AND DIFFERENTIAL - Abnormal    WBC Count 8.9      RBC Count 4.68      Hemoglobin 12.4      Hematocrit 39.6      MCV 85      MCH 26.5      MCHC 31.3 (*)     RDW 15.4 (*)     Platelet Count 217      % Neutrophils 36      % Lymphocytes 53      % Monocytes 9      % Eosinophils 3      % Basophils 1      % Immature Granulocytes 0      NRBCs per 100 WBC 0      Absolute Neutrophils 3.2      Absolute Lymphocytes 4.7      Absolute Monocytes 0.8      Absolute Eosinophils 0.2      Absolute Basophils 0.0      Absolute Immature Granulocytes 0.0      Absolute NRBCs 0.0     RBC AND PLATELET MORPHOLOGY - Abnormal    RBC Morphology Confirmed RBC Indices      Platelet Assessment        Value: Automated Count Confirmed. Platelet morphology is normal.    Polychromasia Slight (*)     Reactive Lymphocytes Present (*)     Target Cells Slight (*)    TROPONIN T, HIGH SENSITIVITY - Normal    Troponin T, High Sensitivity <6     ROUTINE UA WITH MICROSCOPIC REFLEX TO CULTURE - Normal    Color Urine Straw      Appearance Urine Clear      Glucose Urine Negative      Bilirubin Urine Negative      Ketones Urine Negative      Specific Gravity Urine 1.003      Blood Urine Negative      pH Urine 7.0      Protein Albumin Urine Negative      Urobilinogen Urine Normal       Nitrite Urine Negative      Leukocyte Esterase Urine Negative      RBC Urine 0      WBC Urine <1     D DIMER QUANTITATIVE - Normal    D-Dimer Quantitative 0.45       Chest XR,  PA & LAT   Final Result   IMPRESSION: No focal pulmonary consolidation or pleural effusion. Normal heart size without pulmonary vascular congestion. No pneumothorax. Degenerative changes of the thoracic spine. No acute osseous abnormality.               Medical Decision Making  The patient's presentation was of high complexity (an acute health issue posing potential threat to life or bodily function).    The patient's evaluation involved:  ordering and/or review of 3+ test(s) in this encounter (see separate area of note for details)  independent interpretation of testing performed by another health professional (CXR)    The patient's management necessitated only low risk treatment.      Assessments & Plan   Patient presenting with 2 days of intermittent chest pain. Vitals in the ED with mildly elevated BP and mild tachycardia.  Exam with reproducibility upon palpation of the left sternal border, clear symmetric breath sounds. Nursing notes reviewed.     ECG shows normal sinus rhythm without evidence of acute ischemia, high-sensitivity troponin undetectably low- ACS very unlikely. VTE risk factor profile low, appropriate for D-dimer risk stratification, D-dimer within normal limits - PE very unlikely. Character and severity of pain less severe than would be expected for aortic dissection and D-dimer within normal limits-dissection very unlikely.Lack of ECG findings, brief intermittent nature, lack of positional component makes pericarditis very unlikely. CXR without evidence of pneumonia, pneumothorax, pleural effusion, nor other visualized pathology.  Pain was reproducible with palpation suggestive of MSK causes such as costochondritis.    After counseling on the diagnosis, work-up, and treatment plan, the patient was discharged to home.  Recommended NSAIDS. The patient was advised to follow-up with primary care in a few days. The patient was advised to return to the ED if worsening symptoms, or any urgent health concerns.     Final diagnoses:   Atypical chest pain     New Prescriptions    No medications on file     --  Alber Barriga MD   Emergency Medicine   [facility] EMERGENCY DEPARTMENT  6/26/2025       Alber Barriga MD  06/26/25 0440

## 2025-06-26 NOTE — ED TRIAGE NOTES
Patient ambulatory to triage complaining of chest pain that started about two days ago. Its mostly mid sternal and subsides if she gets up take deep breaths. B/P elevated at triage (196/106) and patient says she takes her meds regularly.      Triage Assessment (Adult)       Row Name 06/26/25 0137          Triage Assessment    Airway WDL WDL        Respiratory WDL    Respiratory WDL WDL        Skin Circulation/Temperature WDL    Skin Circulation/Temperature WDL WDL        Cardiac WDL    Cardiac WDL X        Peripheral/Neurovascular WDL    Peripheral Neurovascular WDL WDL        Cognitive/Neuro/Behavioral WDL    Cognitive/Neuro/Behavioral WDL WDL

## 2025-06-26 NOTE — DISCHARGE INSTRUCTIONS
Instructions from your doctor today:  Emergency Department (ED) testing is focused on the potential causes of your symptoms that are the most dangerous possibilities, and cannot cover every possibility. Based on the evaluation, it was deemed sufficiently safe to discharge and continue management through the clinics. Thus, follow-up is very important to assess for improvement/worsening, potential further testing, and potential treatment adjustments. If you were given opioid pain medications or other medications that can make you drowsy while in the ED, you should not drive for at least several hours and not until you feel completely back to normal.     For pain, use either:  800 mg ibuprofen every 6 hours  Or   440 mg naproxen every 12 hours  The two medications should not be combined.     Please make an appointment to follow up with:  - Your Primary Care Provider in 4-7 days  - If you do not have a primary care provider, you can be seen in follow-up and establish care by calling any of the clinics below:     - [facility] (phone: [phone])     - [facility] (phone: [phone])   - Have your clinic provider review the results from today's visit with you again, including any potential follow-up or additional testing that may be needed based on the results. Occasionally, incidental findings are found on later review by radiologists that may need follow-up.     Return to the Emergency Department immediately if you have worsening symptoms, or any other urgent health concerns.

## 2025-07-23 ENCOUNTER — HOSPITAL ENCOUNTER (OUTPATIENT)
Dept: ULTRASOUND IMAGING | Facility: CLINIC | Age: 65
Discharge: HOME | End: 2025-07-23
Attending: FAMILY MEDICINE
Payer: OTHER GOVERNMENT

## 2025-07-23 DIAGNOSIS — G62.9 NEUROPATHY: ICD-10-CM

## 2025-07-23 PROCEDURE — 93924 LWR XTR VASC STDY BILAT: CPT
